# Patient Record
Sex: MALE | Race: WHITE | NOT HISPANIC OR LATINO | Employment: OTHER | URBAN - METROPOLITAN AREA
[De-identification: names, ages, dates, MRNs, and addresses within clinical notes are randomized per-mention and may not be internally consistent; named-entity substitution may affect disease eponyms.]

---

## 2017-01-17 ENCOUNTER — HOSPITAL ENCOUNTER (OUTPATIENT)
Facility: MEDICAL CENTER | Age: 72
End: 2017-01-17
Attending: PODIATRIST
Payer: MEDICARE

## 2017-01-17 LAB
GRAM STN SPEC: NORMAL
SIGNIFICANT IND 70042: NORMAL
SITE SITE: NORMAL
SOURCE SOURCE: NORMAL

## 2017-01-17 PROCEDURE — 87205 SMEAR GRAM STAIN: CPT

## 2017-01-17 PROCEDURE — 87186 SC STD MICRODIL/AGAR DIL: CPT

## 2017-01-17 PROCEDURE — 87070 CULTURE OTHR SPECIMN AEROBIC: CPT

## 2017-01-17 PROCEDURE — 87077 CULTURE AEROBIC IDENTIFY: CPT | Mod: 91

## 2017-01-19 LAB
BACTERIA WND AEROBE CULT: ABNORMAL
BACTERIA WND AEROBE CULT: ABNORMAL
GRAM STN SPEC: ABNORMAL
SIGNIFICANT IND 70042: ABNORMAL
SITE SITE: ABNORMAL
SOURCE SOURCE: ABNORMAL

## 2017-07-14 ENCOUNTER — OFFICE VISIT (OUTPATIENT)
Dept: MEDICAL GROUP | Facility: MEDICAL CENTER | Age: 72
End: 2017-07-14
Payer: MEDICARE

## 2017-07-14 VITALS
HEIGHT: 72 IN | DIASTOLIC BLOOD PRESSURE: 62 MMHG | SYSTOLIC BLOOD PRESSURE: 122 MMHG | BODY MASS INDEX: 24.11 KG/M2 | HEART RATE: 68 BPM | OXYGEN SATURATION: 98 % | WEIGHT: 178 LBS | RESPIRATION RATE: 16 BRPM | TEMPERATURE: 98.7 F

## 2017-07-14 DIAGNOSIS — K21.9 GASTROESOPHAGEAL REFLUX DISEASE WITHOUT ESOPHAGITIS: ICD-10-CM

## 2017-07-14 DIAGNOSIS — N13.9 OBSTRUCTIVE UROPATHY: ICD-10-CM

## 2017-07-14 DIAGNOSIS — I48.0 PAROXYSMAL ATRIAL FIBRILLATION (HCC): ICD-10-CM

## 2017-07-14 PROCEDURE — 99214 OFFICE O/P EST MOD 30 MIN: CPT | Performed by: INTERNAL MEDICINE

## 2017-07-14 NOTE — ASSESSMENT & PLAN NOTE
Gastroesophageal reflux is under good control primarily with diet. He is not significantly overweight.

## 2017-07-14 NOTE — ASSESSMENT & PLAN NOTE
He has obstructive uropathy. He tried taking Flomax but that caused myalgias. He is not taking any medications for this currently.

## 2017-07-14 NOTE — MR AVS SNAPSHOT
Chi Ashley Dr.   2017 2:20 PM   Office Visit   MRN: [id number]    Department:  [address] [place] [address]   Dept Phone:  [phone]    Description:  Male : 1945   Provider:  Servando Baker M.D.           Reason for Visit     Follow-Up Papers      Allergies as of 2017     No Known Allergies      Vital Signs     Blood Pressure Pulse Temperature Respirations Height Weight    122/62 mmHg 68 37.1 °C (98.7 °F) 16 1.829 m (6') 80.74 kg (178 lb)    Body Mass Index Oxygen Saturation Smoking Status             24.14 kg/m2 98% Never Smoker          Basic Information     Date Of Birth Sex Race Ethnicity Preferred Language    1945 Male White Non- English      Problem List              ICD-10-CM Priority Class Noted - Resolved    Splenic infarct D73.5   3/30/2011 - Present    Atrial fibrillation (CMS-HCC) I48.91   3/30/2011 - Present    Epigastric abdominal pain R10.13   3/30/2011 - Present    GERD (gastroesophageal reflux disease) K21.9   3/30/2011 - Present    Leukocytosis D72.829   3/30/2011 - Present    TIA (transient ischemic attack) G45.9   2013 - Present      Health Maintenance        Date Due Completion Dates    IMM DTaP/Tdap/Td Vaccine (1 - Tdap) 1965 ---    IMM ZOSTER VACCINE 2006 ---    IMM PNEUMOCOCCAL 65+ (ADULT) LOW/MEDIUM RISK SERIES (2 of 2 - PCV13) 3/29/2012 3/29/2011    IMM INFLUENZA (1) 2017 ---    COLONOSCOPY 2018            Current Immunizations     Pneumococcal polysaccharide vaccine (PPSV-23) 3/29/2011  6:38 PM      Below and/or attached are the medications your provider expects you to take. Review all of your home medications and newly ordered medications with your provider and/or pharmacist. Follow medication instructions as directed by your provider and/or pharmacist. Please keep your medication list with you and share with your provider. Update the information when medications are discontinued, doses are changed, or new  medications (including over-the-counter products) are added; and carry medication information at all times in the event of emergency situations     Allergies:  No Known Allergies          Medications  Valid as of: July 14, 2017 -  3:48 PM    Generic Name Brand Name Tablet Size Instructions for use    Flecainide Acetate (Tab) TAMBOCOR 150 MG Take 150 mg by mouth 2 times a day.        Metoprolol Succinate (TABLET SR 24 HR) TOPROL XL 25 MG Take 25 mg by mouth every day.        Rivaroxaban (Tab) XARELTO 20 MG Take 1 Tab by mouth every day.        .                 Medicines prescribed today were sent to:     [facility] - [place], [place] - [address]    [address] [place] [address]    Phone: [phone] Fax: [phone]    Open 24 Hours?: No      Medication refill instructions:       If your prescription bottle indicates you have medication refills left, it is not necessary to call your provider’s office. Please contact your pharmacy and they will refill your medication.    If your prescription bottle indicates you do not have any refills left, you may request refills at any time through one of the following ways: The online [employer] system (except Urgent Care), by calling your provider’s office, or by asking your pharmacy to contact your provider’s office with a refill request. Medication refills are processed only during regular business hours and may not be available until the next business day. Your provider may request additional information or to have a follow-up visit with you prior to refilling your medication.   *Please Note: Medication refills are assigned a new Rx number when refilled electronically. Your pharmacy may indicate that no refills were authorized even though a new prescription for the same medication is available at the pharmacy. Please request the medicine by name with the pharmacy before contacting your provider for a refill.           [employer] Access Code: Activation code not  generated  Current MyChart Status: Active

## 2017-07-14 NOTE — PROGRESS NOTES
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 male here today for follow-up of his various health problems and to have his [facility] form completed.    Atrial fibrillation  History of paroxysmal atrial fibrillation. He has undergone ablation therapy and is on flecainide without difficulty. He denies recent palpitations.    GERD (GASTROESOPHAGEAL REFLUX DISEASE)  Gastroesophageal reflux is under good control primarily with diet. He is not significantly overweight.       Diagnoses of Paroxysmal atrial fibrillation (CMS-HCC), Gastroesophageal reflux disease without esophagitis, and Obstructive uropathy were pertinent to this visit.    Allergies: Review of patient's allergies indicates no known allergies.  Current medicines (including changes today)  Current Outpatient Prescriptions   Medication Sig Dispense Refill   • flecainide (TAMBOCOR) 150 MG TABS Take 150 mg by mouth 2 times a day.     • rivaroxaban (XARELTO) 20 MG TABS tablet Take 1 Tab by mouth every day. 30 Tab 0   • metoprolol SR (TOPROL XL) 25 MG TB24 Take 25 mg by mouth every day.       No current facility-administered medications for this visit.       He  has a past medical history of A-fib (CMS-HCC) (04-20-11); Stroke (CMS-HCC) (8/2013); and Obstructive uropathy (7/14/2017).    ROS    Patient denies significant change in strength, weight or appetite.  No significant lightheadedness or headaches.  No change in vision, hearing, or swallowing.  No new dyspnea, coughing, chest pain, or palpitations.  No indigestion, abdominal pain, or change in bowel habits. Mild gastroesophageal reflux symptoms.  No change in urinating. Mild obstructive uropathy.  No new ankle swelling.       Objective:     PE:  /62 mmHg  Pulse 68  Temp(Src) 37.1 °C (98.7 °F)  Resp 16  Ht 1.829 m (6')  Wt 80.74 kg (178 lb)  BMI 24.14 kg/m2  SpO2 98%   Neck is supple without significant  lymphadenopathy or masses.  Lungs are clear with normal breath sounds without wheezes or rales .  Cardiovascular: peripheral circulation is satisfactory, heart sounds are unchanged and unremarkable.  Abdomen is soft, without masses or tenderness, with normal bowel sounds. Genitalia were normal male. Rectal exam was unremarkable with prostate about 3+ in size and a fairly normal contour and consistency.  Extremities are without significant edema, cyanosis or deformity. Neurologic function was normal with essentially normal sensation, strength, gait, and cerebellar functions. He could remember 3 objects and could draw a clock face.      Assessment and Plan:   The following treatment plan was discussed  1. Paroxysmal atrial fibrillation (CMS-[facility])      Follow-up periodically with Dr. Peña. He will report any significant palpitations or lightheadedness.   2. Gastroesophageal reflux disease without esophagitis      We briefly reviewed appropriate conservative therapy.   3. Obstructive uropathy      He will report any exacerbation of symptoms and we will consider further evaluation or treatment as indicated.       Followup: No Follow-up on file.

## 2017-07-14 NOTE — ASSESSMENT & PLAN NOTE
History of paroxysmal atrial fibrillation. He has undergone ablation therapy and is on flecainide without difficulty. He denies recent palpitations.

## 2017-08-08 ENCOUNTER — TELEPHONE (OUTPATIENT)
Dept: MEDICAL GROUP | Facility: MEDICAL CENTER | Age: 72
End: 2017-08-08

## 2017-08-08 NOTE — TELEPHONE ENCOUNTER
Phone Number Called: [phone]      Message: returned a call to patient regarding his concern about his diagnosis in his chart, spoke with Dr Baker and he advised he will discuss this with the patient at his next visit, left  letting patient know     Left Message for patient to call back: N\A

## 2017-08-10 ENCOUNTER — TELEPHONE (OUTPATIENT)
Dept: MEDICAL GROUP | Facility: MEDICAL CENTER | Age: 72
End: 2017-08-10

## 2017-08-18 ENCOUNTER — APPOINTMENT (OUTPATIENT)
Dept: MEDICAL GROUP | Facility: MEDICAL CENTER | Age: 72
End: 2017-08-18
Payer: MEDICARE

## 2017-09-28 ENCOUNTER — OFFICE VISIT (OUTPATIENT)
Dept: MEDICAL GROUP | Facility: MEDICAL CENTER | Age: 72
End: 2017-09-28
Payer: MEDICARE

## 2017-09-28 VITALS
OXYGEN SATURATION: 95 % | RESPIRATION RATE: 20 BRPM | HEART RATE: 64 BPM | SYSTOLIC BLOOD PRESSURE: 130 MMHG | TEMPERATURE: 98 F | WEIGHT: 178 LBS | BODY MASS INDEX: 24.11 KG/M2 | HEIGHT: 72 IN | DIASTOLIC BLOOD PRESSURE: 78 MMHG

## 2017-09-28 DIAGNOSIS — D73.5 SPLENIC INFARCT: ICD-10-CM

## 2017-09-28 DIAGNOSIS — M25.561 ACUTE PAIN OF RIGHT KNEE: ICD-10-CM

## 2017-09-28 DIAGNOSIS — R94.4 DECREASED GFR: ICD-10-CM

## 2017-09-28 DIAGNOSIS — R35.0 URINARY FREQUENCY: ICD-10-CM

## 2017-09-28 DIAGNOSIS — Z00.00 HEALTHCARE MAINTENANCE: ICD-10-CM

## 2017-09-28 DIAGNOSIS — Z86.73 HISTORY OF TIA (TRANSIENT ISCHEMIC ATTACK): ICD-10-CM

## 2017-09-28 DIAGNOSIS — H60.61 CHRONIC OTITIS EXTERNA OF RIGHT EAR, UNSPECIFIED TYPE: ICD-10-CM

## 2017-09-28 DIAGNOSIS — Z23 NEED FOR VACCINATION: ICD-10-CM

## 2017-09-28 DIAGNOSIS — Z86.79 HISTORY OF ATRIAL FIBRILLATION: ICD-10-CM

## 2017-09-28 PROBLEM — N13.9 OBSTRUCTIVE UROPATHY: Status: RESOLVED | Noted: 2017-07-14 | Resolved: 2017-09-28

## 2017-09-28 PROBLEM — M25.569 KNEE PAIN: Status: ACTIVE | Noted: 2017-09-28

## 2017-09-28 PROBLEM — H60.90 OTITIS EXTERNA: Status: ACTIVE | Noted: 2017-09-28

## 2017-09-28 PROCEDURE — G0008 ADMIN INFLUENZA VIRUS VAC: HCPCS | Performed by: FAMILY MEDICINE

## 2017-09-28 PROCEDURE — 90662 IIV NO PRSV INCREASED AG IM: CPT | Performed by: FAMILY MEDICINE

## 2017-09-28 PROCEDURE — G0009 ADMIN PNEUMOCOCCAL VACCINE: HCPCS | Performed by: FAMILY MEDICINE

## 2017-09-28 PROCEDURE — 99214 OFFICE O/P EST MOD 30 MIN: CPT | Mod: 25 | Performed by: FAMILY MEDICINE

## 2017-09-28 PROCEDURE — 90670 PCV13 VACCINE IM: CPT | Performed by: FAMILY MEDICINE

## 2017-09-28 ASSESSMENT — PATIENT HEALTH QUESTIONNAIRE - PHQ9: CLINICAL INTERPRETATION OF PHQ2 SCORE: 0

## 2017-09-28 NOTE — ASSESSMENT & PLAN NOTE
"The patient describes several years of urinary frequency without urgency or dysuria. The patient had a prostate exam performed on 7/14/2017 by Dr. Baker which, in his words, \"was unremarkable with prostate about 3+ in size and a fairly normal contour and consistency\". Patient states that he has had PSA testing in the past and this was normal. He is not currently interested in PSA testing. He states he used tamsulosin in the past for this issue and it caused him muscle cramps and so he is just treating it supportively.  "

## 2017-09-28 NOTE — ASSESSMENT & PLAN NOTE
The last kidney function I have was done on 3/24/2015. This showed a GFR of 54 and a creatinine of 1.31. The patient states that about 3 months ago he had labs done. He believes that these included a BMP and believes that the BMP was normal. He agrees to bring in this lab for me. Of note, I do see that the patient had a urinalysis in the past positive for protein.

## 2017-09-28 NOTE — ASSESSMENT & PLAN NOTE
In 2011 the patient underwent a radiofrequency ablation for atrial flutter. I see that he was discharged on flecainide and Predaxa. The patient then developed atrial fibrillation and in 2014 the patient underwent a radiofrequency ablation. I see that the patient was discharged on Eliquis and flecainide. Patient states that he was on anticoagulation and antiarrhythmic therapy for 4 months as a precaution. He states that Dr. Peña (cardiology) stopped these therapies. He states that he is not currently taking any medical therapy for this issue as he has not had any recurrent episodes of either atrial fibrillation or atrial flutter. The patient denies chest pain, shortness of breath and palpitations today.

## 2017-09-28 NOTE — ASSESSMENT & PLAN NOTE
Lipids: patient states these were done about 3 months ago. He agrees to bring in the labs.   Fasting Glucose: 3/24/15 was 100.   Hepatitis C Screen: patient states was tested in the past and was normal.   Colonoscopy: November 8, 2013 done, recommended 5 year follow-up.    Flu vaccine: given 09/28/17.   Pneumonia vaccine: pneumovax given 2011. Prevnar given 09/28/17.   Tdap: declines today.   Zoster vaccine: given 2016.

## 2017-09-28 NOTE — ASSESSMENT & PLAN NOTE
The patient states that he has chronic fungal otitis externa and follows with Dr. Burton who has him on a regimen of topical alcohol and vinegar. He states he is doing very well with this treatment and denies any ear pain.

## 2017-09-29 ENCOUNTER — TELEPHONE (OUTPATIENT)
Dept: MEDICAL GROUP | Facility: MEDICAL CENTER | Age: 72
End: 2017-09-29

## 2017-09-29 NOTE — TELEPHONE ENCOUNTER
I saw this patient in clinic yesterday. He was going to bring in recent labs so I can review them. Can someone call him and see if he can also track down his last PSA so I could look at it? Could we also get a records release from him when he comes in so we can request the last 3 notes from Dr. Peña (his cardiologist)? Thanks!

## 2017-09-29 NOTE — ASSESSMENT & PLAN NOTE
In 2013 the patient was admitted to the hospital for what he describes as a TIA. He was discharged on Xarelto, simvastatin, flecainide and metoprolol. On November 18, 2013 the patient followed up with Dr. Sears (neurology) who noted that the patient had an embolic event and recommended that he stay on Xarelto for at least a year. 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 11/15/2013 MRA shows a normal [place] and no significant neck artery stenosis. The patient denies residual effects.

## 2017-09-29 NOTE — PROGRESS NOTES
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     History of atrial fibrillation  In 2011 the patient underwent a radiofrequency ablation for atrial flutter. I see that he was discharged on flecainide and Predaxa. The patient then developed atrial fibrillation and in 2014 the patient underwent a radiofrequency ablation. I see that the patient was discharged on Eliquis and flecainide. Patient states that he was on anticoagulation and antiarrhythmic therapy for 4 months as a precaution. He states that Dr. Peña (cardiology) stopped these therapies. He states that he is not currently taking any medical therapy for this issue as he has not had any recurrent episodes of either atrial fibrillation or atrial flutter. The patient denies chest pain, shortness of breath and palpitations today.    Urinary frequency  The patient describes several years of urinary frequency without urgency or dysuria. The patient had a prostate exam performed on 7/14/2017 by Dr. Baker which, in his words, \"was unremarkable with prostate about 3+ in size and a fairly normal contour and consistency\". Patient states that he has had PSA testing in the past and this was normal. He is not currently interested in PSA testing. He states he used tamsulosin in the past for this issue and it caused him muscle cramps and so he is just treating it supportively.    Healthcare maintenance  Lipids: patient states these were done about 3 months ago. He agrees to bring in the labs.   Fasting Glucose: 3/24/15 was 100.   Hepatitis C Screen: patient states was tested in the past and was normal.   Colonoscopy: November 8, 2013 done, recommended 5 year follow-up.    Flu vaccine: given 09/28/17.   Pneumonia vaccine: pneumovax given 2011. Prevnar given 09/28/17.   Tdap: declines today.   Zoster vaccine: given " "2016.     Otitis externa  The patient states that he has chronic fungal otitis externa and follows with Dr. Burton who has him on a regimen of topical alcohol and vinegar. He states he is doing very well with this treatment and denies any ear pain.    Decreased GFR  The last kidney function I have was done on 3/24/2015. This showed a GFR of 54 and a creatinine of 1.31. The patient states that about 3 months ago he had labs done. He believes that these included a BMP and believes that the BMP was normal. He agrees to bring in this lab for me. Of note, I do see that the patient had a urinalysis in the past positive for protein.     Splenic infarct  In March 2011 the patient was admitted to the hospital for acute onset epigastric abdominal pain (since resolved). During this admission a CT scan of the abdomen was obtained which showed a \"sharply demarcated area of decreased attenuation anteriorly within the spleen, consistent with a recent splenic infarction\". Apparently, gastroenterology was consulted and an endoscopy showed gastroduodenitis    Knee pain  The patient states that about a month ago he knelt on the ground and caused some compression to his right lateral knee that has been superficially painful ever since. There are no range of motion difficulties and the patient is requesting Voltaren gel for this issue.    History of TIA (transient ischemic attack)  In 2013 the patient was admitted to the hospital for what he describes as a TIA. He was discharged on Xarelto, simvastatin, flecainide and metoprolol. On November 18, 2013 the patient followed up with Dr. Sears (neurology) who noted that the patient had an embolic event and recommended that he stay on Xarelto for at least a year. 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 11/15/2013 MRA shows a normal [place] and no significant neck artery stenosis. The patient denies residual effects.       No current " outpatient prescriptions on file prior to visit.     No current facility-administered medications on file prior to visit.        Past Medical History:   Diagnosis Date   • A-fib (CMS-HCC) 04-20-11    A Flutter   • GERD (gastroesophageal reflux disease)    • Obstructive uropathy    • TIA (transient ischemic attack)        Allergies: Review of patient's allergies indicates no known allergies.    Surgical History:  has a past surgical history that includes cardioversion; gastroscopy with biopsy (3/30/2011); appendectomy (1963); recovery (4/22/2011); and recovery (7/17/2014).    Family History: family history includes Cancer in his mother; Heart Disease in his father; Lung Disease in his father.    Social History:  reports that he has never smoked. He has never used smokeless tobacco. He reports that he drinks alcohol. He reports that he does not use drugs.    ROS: Denies fever, chest pain, shortness of breath, palpitations, nausea, vomiting, diarrhea, swelling..        Objective:     Vitals:    09/28/17 1445   BP: 130/78   Pulse: 64   Resp: 20   Temp: 36.7 °C (98 °F)   SpO2: 95%   Weight: 80.7 kg (178 lb)   Height: 1.829 m (6')       Physical Exam:  General: alert in no apparent distress.   Cardio: regular rate and rhythm, no murmurs, rubs or gallops.   Resp: CTAB no w/r/r.   GI: Soft, nontender, nondistended. No hepatosplenomegaly.  Neuro: Cranial nerves II through XII intact, Romberg normal, gait normal. Strength 5-5 in bilateral upper extremity.  Musculoskeletal: Bilateral knees without redness, swelling, warmth. No popliteal pulsations bilaterally. No joint line tenderness bilaterally. Anterior and posterior drawer normal bilaterally. No joint line tenderness bilaterally. Lachman negative bilaterally. There is no pain over the pes anserine bursa. Thessaly negative bilaterally.  ENMT: no pain on manipulation of the external ears. No acoustica canal erythema, swelling or discharge.    Assessment and Plan:     1. Need  for vaccination  - INFLUENZA VACCINE, HIGH DOSE (65+ ONLY)  - Pneumococcal Conjugate Vaccine 13-Valent <4yo IM    2. History of atrial fibrillation  Status post radiofrequency ablation and, per patient, 4 months of antiarrhythmic and anticoagulation therapy now doing well in RRR on no anticoagulation or antiarrhythmic therapy.   - Patient will bring in recent labs.  - records requested from Dr. Peña (cardiology) to ensure no further anticoagulation and antiarrhythmic therapy is needed.    3. Urinary frequency  Suspect BPH. Prostate exam from Dr. Baker described in HPI. Patient states last PSA normal. Not interested in repeat. Intolerant to medical therapy.  - Plan on getting urinalysis, will await patient to bring in labs to see if we also need to add on a urine microalbumin.  - will see if the patient can track down latest PSA so I can look at it.     4. History of TIA (transient ischemic attack)  Attributed to embolism from atrial arrhythmia, now in regular rate and rhythm. The patient is intolerant to statins. He is not on aspirin. Although this was noted to be an embolic event, nonetheless, I still think the patient may benefit from bASA. I will need to review recent labs and would like to review Dr. Peña's last note before starting this therapy. Patient intolerant to statins so I wonder if he'd be a good candidate for PCSK9 inhibitor. Again, I'd like to review Dr. Peña's last note and recent labs.   - obtain records from Dr. Peña and patient will bring in latest labs.     5. Healthcare maintenance  See history of present illness.    6. Chronic otitis externa of right ear, unspecified type  Established, stable.  -Continue to follow with ENT.    7. Decreased GFR  Patient agrees to bring in labs. If BMP is not included, will repeat.     8. Acute pain of right knee  Describes a superficial pain not reproducible today with normal exam. Because of the patient's age, I offered him a knee  x-ray. He declines.  - Diclofenac Sodium 1 % Gel; Apply 4 g to skin as directed 4 times a day as needed (knee pain).  Dispense: 6 Tube; Refill: 2. Will return if worsens or persists.     9. Possible splenic infarct  Incidental radiographic finding from many years ago which may represent a splenic infarct, of uncertain clinical significance. Exam normal today. Perhaps due to embolic event at that time? I offered the patient a gastroenterology consultation. He would like to speak about this with a radiologist and gastroenterologist that he knows instead of going through the referral process. Will also obtain records from Dr. Peña      Followup: Return in about 1 year (around 9/28/2018) for Wellness Visit, Long. After reviewing records from Dr. Peña and labs, sooner f/u may be in order.

## 2017-09-29 NOTE — ASSESSMENT & PLAN NOTE
The patient states that about a month ago he knelt on the ground and caused some compression to his right lateral knee that has been superficially painful ever since. There are no range of motion difficulties and the patient is requesting Voltaren gel for this issue.

## 2017-09-29 NOTE — LETTER
[facility]  Ryan Omalley M.D.  [address]  [facility] [address]  Fax: [phone]   Authorization for Release/Disclosure of   Protected Health Information   Name: PRESTON BRITO DR. : 1945 SSN: [id number]   Address: [address] [place] [id number] Phone:    [phone] (home) [phone] (work)   I authorize the entity listed below to release/disclose the PHI below to:   [facility]/Ryan Omalley M.D. and Ryan Omalley M.D.   Provider or Entity Name: Dr. Peña     Address [address]  [place]  [address]   Phone:  [phone]    Fax:  [phone]   Reason for request: continuity of care   Information to be released:    [  ] LAST COLONOSCOPY,  including any PATH REPORT and follow-up  [  ] LAST FIT/COLOGUARD RESULT [  ] LAST DEXA  [  ] LAST MAMMOGRAM  [  ] LAST PAP  [  ] LAST LABS [  ] RETINA EXAM REPORT  [  ] IMMUNIZATION RECORDS  [  x] Release all info      [  ] Check here and initial the line next to each item to release ALL health information INCLUDING  _____ Care and treatment for drug and / or alcohol abuse  _____ HIV testing, infection status, or AIDS  _____ Genetic Testing    DATES OF SERVICE OR TIME PERIOD TO BE DISCLOSED: _____________  I understand and acknowledge that:  * This Authorization may be revoked at any time by you in writing, except if your health information has already been used or disclosed.  * Your health information that will be used or disclosed as a result of you signing this authorization could be re-disclosed by the recipient. If this occurs, your re-disclosed health information may no longer be protected by State or Federal laws.  * You may refuse to sign this Authorization. Your refusal will not affect your ability to obtain treatment.  * This Authorization becomes effective upon signing and will  on (date) __________.      If no date is indicated, this Authorization will  one (1)  year from the signature date.    Name: Chi Ashley Dr.    Signature:   Date:     9/29/2017       PLEASE FAX REQUESTED RECORDS BACK TO: [phone]

## 2017-09-29 NOTE — ASSESSMENT & PLAN NOTE
"In March 2011 the patient was admitted to the hospital for acute onset epigastric abdominal pain (since resolved). During this admission a CT scan of the abdomen was obtained which showed a \"sharply demarcated area of decreased attenuation anteriorly within the spleen, consistent with a recent splenic infarction\". Apparently, gastroenterology was consulted and an endoscopy showed gastroduodenitis  "

## 2018-05-25 ENCOUNTER — OFFICE VISIT (OUTPATIENT)
Dept: MEDICAL GROUP | Facility: MEDICAL CENTER | Age: 73
End: 2018-05-25
Payer: MEDICARE

## 2018-05-25 ENCOUNTER — TELEPHONE (OUTPATIENT)
Dept: MEDICAL GROUP | Facility: MEDICAL CENTER | Age: 73
End: 2018-05-25

## 2018-05-25 VITALS
HEART RATE: 58 BPM | SYSTOLIC BLOOD PRESSURE: 118 MMHG | OXYGEN SATURATION: 97 % | RESPIRATION RATE: 20 BRPM | WEIGHT: 176.37 LBS | HEIGHT: 72 IN | TEMPERATURE: 97.6 F | DIASTOLIC BLOOD PRESSURE: 80 MMHG | BODY MASS INDEX: 23.89 KG/M2

## 2018-05-25 DIAGNOSIS — M25.561 ACUTE PAIN OF RIGHT KNEE: ICD-10-CM

## 2018-05-25 DIAGNOSIS — R35.0 URINARY FREQUENCY: ICD-10-CM

## 2018-05-25 DIAGNOSIS — Z86.79 HISTORY OF ATRIAL FIBRILLATION: ICD-10-CM

## 2018-05-25 DIAGNOSIS — Z86.73 HISTORY OF TIA (TRANSIENT ISCHEMIC ATTACK): ICD-10-CM

## 2018-05-25 DIAGNOSIS — R94.4 DECREASED GFR: ICD-10-CM

## 2018-05-25 DIAGNOSIS — Z00.00 HEALTHCARE MAINTENANCE: ICD-10-CM

## 2018-05-25 DIAGNOSIS — R97.20 ELEVATED PSA: ICD-10-CM

## 2018-05-25 PROCEDURE — 99214 OFFICE O/P EST MOD 30 MIN: CPT | Performed by: FAMILY MEDICINE

## 2018-05-25 NOTE — ASSESSMENT & PLAN NOTE
Lipids: see media.   Fasting Glucose: see media.  Hepatitis C Screen: patient states was tested in the past and was normal.   Colonoscopy: November 8, 2013 done, recommended 5 year follow-up.    Pneumonia vaccine: pneumovax given 2011. Prevnar given 09/28/17.   Tdap: declines today.   Zoster vaccine: given 2016.   Shingrix: patient states he received.

## 2018-05-25 NOTE — ASSESSMENT & PLAN NOTE
The patient comes in with a 4 month history of right knee pain. He states that he was squatting and developed right medial knee pain with a slight effusion. The effusion improved, however, the medial knee pain persists. NSAIDs do provide some relief. He says that sometimes he notices catching but no locking.

## 2018-05-25 NOTE — ASSESSMENT & PLAN NOTE
7/1/17 PSA in labs provided by patient was 5.56. 11/1/17 PSA was 5.38. I did recommend a recheck. Patient understands the risk prostate cancer but declines this rechecked.

## 2018-05-25 NOTE — TELEPHONE ENCOUNTER
We got records from Dr. Peña (cardiology with [facility]) but they weren't complete. Can we re-request his complete record?

## 2018-05-25 NOTE — PROGRESS NOTES
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 The patient is alone.     Knee pain  The patient comes in with a 4 month history of right knee pain. He states that he was squatting and developed right medial knee pain with a slight effusion. The effusion improved, however, the medial knee pain persists. NSAIDs do provide some relief. He says that sometimes he notices catching but no locking.    Decreased GFR  11/17 labs provided by the patient showed GFR of 53.    Elevated PSA  7/1/17 PSA in labs provided by patient was 5.56. 11/1/17 PSA was 5.38. I did recommend a recheck. Patient understands the risk prostate cancer but declines this rechecked.    History of atrial fibrillation  In 2011 the patient underwent a radiofrequency ablation for atrial flutter. I see that he was discharged on flecainide and Predaxa. The patient then developed atrial fibrillation and in 2014 the patient underwent a radiofrequency ablation. I see that the patient was discharged on Eliquis and flecainide. Patient stated that he was on anticoagulation and antiarrhythmic therapy for 4 months as a precaution. He stated at the last visit that Dr. Peña (cardiology) stopped these therapies he is not currently taking any medical therapy for this issue as he has not had any recurrent episodes of either atrial fibrillation or atrial flutter. We were able to obtain records from Dr. Peña these records appear to be incomplete. The patient continues to assure me that he is not supposed to be on antiarrhythmic therapy or anticoagulants.    History of TIA (transient ischemic attack)  In 2013 the patient was admitted to the hospital for what he describes as a TIA. He was discharged on Xarelto, simvastatin, flecainide and metoprolol. On November 18, 2013 the patient followed up with Dr. Sears (neurology) who noted that the  patient had an embolic event and recommended that he stay on Xarelto for at least a year. 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 11/15/2013 MRA shows a normal [place] and no significant neck artery stenosis. The patient does not appear to have any residual effects. He is not currently maintained on antiarrhythmic or anticoagulant therapy, as discussed elsewhere.    Urinary frequency  The patient continues to describe urinary frequency issues. He states he would like to follow up with his urologist regarding this.    Healthcare maintenance  Lipids: see media.   Fasting Glucose: see media.  Hepatitis C Screen: patient states was tested in the past and was normal.   Colonoscopy: November 8, 2013 done, recommended 5 year follow-up.    Pneumonia vaccine: pneumovax given 2011. Prevnar given 09/28/17.   Tdap: declines today.   Zoster vaccine: given 2016.   Shingrix: patient states he received.       Current Outpatient Prescriptions on File Prior to Visit   Medication Sig Dispense Refill   • Diclofenac Sodium 1 % Gel Apply 4 g to skin as directed 4 times a day as needed (knee pain). 6 Tube 2     No current facility-administered medications on file prior to visit.        Past Medical History:   Diagnosis Date   • A-fib (HCC) 04-20-11    A Flutter   • GERD (gastroesophageal reflux disease)    • Obstructive uropathy    • TIA (transient ischemic attack)        Allergies: Patient has no known allergies.    Surgical History:  has a past surgical history that includes cardioversion; gastroscopy with biopsy (3/30/2011); appendectomy (1963); recovery (4/22/2011); and recovery (7/17/2014).    Family History: family history includes Cancer in his mother; Heart Disease in his father; Lung Disease in his father.    Social History:  reports that he has never smoked. He has never used smokeless tobacco. He reports that he drinks alcohol. He reports that he does not use  drugs.    ROS: no CP or SOB.        Objective:     Vitals:    05/25/18 1150   BP: 118/80   Pulse: (!) 58   Resp: 20   Temp: 36.4 °C (97.6 °F)   SpO2: 97%   Weight: 80 kg (176 lb 5.9 oz)   Height: 1.829 m (6')       Physical Exam:  General: alert in no apparent distress.   Cardio: regular rate and rhythm, no murmurs, rubs or gallops.   Resp: CTAB no w/r/r.   Right knee: Some medial joint line tenderness, no lateral joint line tenderness. No tenderness to palpation over the quadriceps or patellar tendon. Negative anterior drawer, negative posterior drawer. Negative Kane. Positive Thessaly on the medial side. Negative varus and valgus stress.        Assessment and Plan:     1. Acute pain of right knee  With some medial joint line tenderness and a positive Thessaly test.   - MR-KNEE-W/O RIGHT; Future    2. Decreased GFR  - MICROALBUMIN CREAT RATIO URINE; Future  - COMP METABOLIC PANEL; Future    3. Elevated PSA  - patient declines recheck.     4. History of atrial fibrillation  - re-request full record from patient's cardiologist. Patient states he is not supposed to be on anticoagulants or antiarrhythmics/AV richard blockers.  - start ASA 81 mg daily.     5. History of TIA (transient ischemic attack)  - start ASA 81 mg daily, patient states he is not supposed to be on any anticoagulants.  - Advised the patient speak with his cardiologist about a PCSK9 inhibitor.     6. Urinary frequency  - f/u urology.     7. Healthcare maintenance  - see HPI.         Followup: Return in about 1 year (around 5/25/2019), or if symptoms worsen or fail to improve, for Wellness Visit, Long.

## 2018-05-25 NOTE — ASSESSMENT & PLAN NOTE
In 2013 the patient was admitted to the hospital for what he describes as a TIA. He was discharged on Xarelto, simvastatin, flecainide and metoprolol. On November 18, 2013 the patient followed up with Dr. Sears (neurology) who noted that the patient had an embolic event and recommended that he stay on Xarelto for at least a year. 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 11/15/2013 MRA shows a normal [place] and no significant neck artery stenosis. The patient does not appear to have any residual effects. He is not currently maintained on antiarrhythmic or anticoagulant therapy, as discussed elsewhere.

## 2018-05-25 NOTE — ASSESSMENT & PLAN NOTE
The patient continues to describe urinary frequency issues. He states he would like to follow up with his urologist regarding this.

## 2018-05-25 NOTE — ASSESSMENT & PLAN NOTE
In 2011 the patient underwent a radiofrequency ablation for atrial flutter. I see that he was discharged on flecainide and Predaxa. The patient then developed atrial fibrillation and in 2014 the patient underwent a radiofrequency ablation. I see that the patient was discharged on Eliquis and flecainide. Patient stated that he was on anticoagulation and antiarrhythmic therapy for 4 months as a precaution. He stated at the last visit that Dr. Peña (cardiology) stopped these therapies he is not currently taking any medical therapy for this issue as he has not had any recurrent episodes of either atrial fibrillation or atrial flutter. We were able to obtain records from Dr. Peña these records appear to be incomplete. The patient continues to assure me that he is not supposed to be on antiarrhythmic therapy or anticoagulants.

## 2018-05-26 ENCOUNTER — HOSPITAL ENCOUNTER (OUTPATIENT)
Dept: RADIOLOGY | Facility: MEDICAL CENTER | Age: 73
End: 2018-05-26
Attending: FAMILY MEDICINE
Payer: MEDICARE

## 2018-05-26 ENCOUNTER — HOSPITAL ENCOUNTER (OUTPATIENT)
Dept: LAB | Facility: MEDICAL CENTER | Age: 73
End: 2018-05-26
Attending: FAMILY MEDICINE
Payer: MEDICARE

## 2018-05-26 DIAGNOSIS — M25.561 ACUTE PAIN OF RIGHT KNEE: ICD-10-CM

## 2018-05-26 DIAGNOSIS — R94.4 DECREASED GFR: ICD-10-CM

## 2018-05-26 LAB
ALBUMIN SERPL BCP-MCNC: 3.8 G/DL (ref 3.2–4.9)
ALBUMIN/GLOB SERPL: 1.4 G/DL
ALP SERPL-CCNC: 73 U/L (ref 30–99)
ALT SERPL-CCNC: 17 U/L (ref 2–50)
ANION GAP SERPL CALC-SCNC: 5 MMOL/L (ref 0–11.9)
AST SERPL-CCNC: 17 U/L (ref 12–45)
BILIRUB SERPL-MCNC: 0.5 MG/DL (ref 0.1–1.5)
BUN SERPL-MCNC: 25 MG/DL (ref 8–22)
CALCIUM SERPL-MCNC: 9.1 MG/DL (ref 8.5–10.5)
CHLORIDE SERPL-SCNC: 105 MMOL/L (ref 96–112)
CO2 SERPL-SCNC: 27 MMOL/L (ref 20–33)
CREAT SERPL-MCNC: 1.44 MG/DL (ref 0.5–1.4)
CREAT UR-MCNC: 89.5 MG/DL
GLOBULIN SER CALC-MCNC: 2.8 G/DL (ref 1.9–3.5)
GLUCOSE SERPL-MCNC: 101 MG/DL (ref 65–99)
MICROALBUMIN UR-MCNC: <0.7 MG/DL
MICROALBUMIN/CREAT UR: NORMAL MG/G (ref 0–30)
POTASSIUM SERPL-SCNC: 4.5 MMOL/L (ref 3.6–5.5)
PROT SERPL-MCNC: 6.6 G/DL (ref 6–8.2)
SODIUM SERPL-SCNC: 137 MMOL/L (ref 135–145)

## 2018-05-26 PROCEDURE — 73721 MRI JNT OF LWR EXTRE W/O DYE: CPT | Mod: RT

## 2018-05-26 PROCEDURE — 36415 COLL VENOUS BLD VENIPUNCTURE: CPT

## 2018-05-26 PROCEDURE — 80053 COMPREHEN METABOLIC PANEL: CPT

## 2018-05-26 PROCEDURE — 82043 UR ALBUMIN QUANTITATIVE: CPT

## 2018-05-26 PROCEDURE — 82570 ASSAY OF URINE CREATININE: CPT

## 2018-05-29 DIAGNOSIS — N18.30 STAGE 3 CHRONIC KIDNEY DISEASE (HCC): ICD-10-CM

## 2018-06-07 ENCOUNTER — APPOINTMENT (OUTPATIENT)
Dept: ADMISSIONS | Facility: MEDICAL CENTER | Age: 73
End: 2018-06-07
Attending: ORTHOPAEDIC SURGERY
Payer: MEDICARE

## 2018-06-07 DIAGNOSIS — Z01.810 PRE-OPERATIVE CARDIOVASCULAR EXAMINATION: ICD-10-CM

## 2018-06-07 LAB — EKG IMPRESSION: NORMAL

## 2018-06-15 ENCOUNTER — HOSPITAL ENCOUNTER (OUTPATIENT)
Facility: MEDICAL CENTER | Age: 73
End: 2018-06-15
Attending: ORTHOPAEDIC SURGERY | Admitting: ORTHOPAEDIC SURGERY
Payer: MEDICARE

## 2018-06-15 ENCOUNTER — APPOINTMENT (OUTPATIENT)
Dept: RADIOLOGY | Facility: MEDICAL CENTER | Age: 73
End: 2018-06-15
Attending: ORTHOPAEDIC SURGERY
Payer: MEDICARE

## 2018-06-15 VITALS
TEMPERATURE: 97.5 F | BODY MASS INDEX: 23.83 KG/M2 | WEIGHT: 175.93 LBS | RESPIRATION RATE: 16 BRPM | SYSTOLIC BLOOD PRESSURE: 145 MMHG | HEIGHT: 72 IN | DIASTOLIC BLOOD PRESSURE: 74 MMHG | OXYGEN SATURATION: 97 %

## 2018-06-15 PROCEDURE — 700105 HCHG RX REV CODE 258: Performed by: ORTHOPAEDIC SURGERY

## 2018-06-15 PROCEDURE — A6222 GAUZE <=16 IN NO W/SAL W/O B: HCPCS | Performed by: ORTHOPAEDIC SURGERY

## 2018-06-15 PROCEDURE — 160025 RECOVERY II MINUTES (STATS): Performed by: ORTHOPAEDIC SURGERY

## 2018-06-15 PROCEDURE — 700101 HCHG RX REV CODE 250

## 2018-06-15 PROCEDURE — 160009 HCHG ANES TIME/MIN: Performed by: ORTHOPAEDIC SURGERY

## 2018-06-15 PROCEDURE — 160035 HCHG PACU - 1ST 60 MINS PHASE I: Performed by: ORTHOPAEDIC SURGERY

## 2018-06-15 PROCEDURE — 160041 HCHG SURGERY MINUTES - EA ADDL 1 MIN LEVEL 4: Performed by: ORTHOPAEDIC SURGERY

## 2018-06-15 PROCEDURE — 500423 HCHG DRESSING, ABD COMBINE: Performed by: ORTHOPAEDIC SURGERY

## 2018-06-15 PROCEDURE — 160048 HCHG OR STATISTICAL LEVEL 1-5: Performed by: ORTHOPAEDIC SURGERY

## 2018-06-15 PROCEDURE — 160029 HCHG SURGERY MINUTES - 1ST 30 MINS LEVEL 4: Performed by: ORTHOPAEDIC SURGERY

## 2018-06-15 PROCEDURE — 160046 HCHG PACU - 1ST 60 MINS PHASE II: Performed by: ORTHOPAEDIC SURGERY

## 2018-06-15 PROCEDURE — 160002 HCHG RECOVERY MINUTES (STAT): Performed by: ORTHOPAEDIC SURGERY

## 2018-06-15 PROCEDURE — 502580 HCHG PACK, KNEE ARTHROSCOPY: Performed by: ORTHOPAEDIC SURGERY

## 2018-06-15 PROCEDURE — 500028 HCHG ARTHROWAND TURBOVAC 3.5/90 SUCT.: Performed by: ORTHOPAEDIC SURGERY

## 2018-06-15 PROCEDURE — 700111 HCHG RX REV CODE 636 W/ 250 OVERRIDE (IP)

## 2018-06-15 RX ORDER — MORPHINE SULFATE 0.5 MG/ML
INJECTION, SOLUTION EPIDURAL; INTRATHECAL; INTRAVENOUS
Status: DISCONTINUED | OUTPATIENT
Start: 2018-06-15 | End: 2018-06-15 | Stop reason: HOSPADM

## 2018-06-15 RX ORDER — BUPIVACAINE HYDROCHLORIDE AND EPINEPHRINE 5; 5 MG/ML; UG/ML
INJECTION, SOLUTION EPIDURAL; INTRACAUDAL; PERINEURAL
Status: DISCONTINUED | OUTPATIENT
Start: 2018-06-15 | End: 2018-06-15 | Stop reason: HOSPADM

## 2018-06-15 RX ORDER — SODIUM CHLORIDE, SODIUM LACTATE, POTASSIUM CHLORIDE, CALCIUM CHLORIDE 600; 310; 30; 20 MG/100ML; MG/100ML; MG/100ML; MG/100ML
INJECTION, SOLUTION INTRAVENOUS
Status: DISCONTINUED | OUTPATIENT
Start: 2018-06-15 | End: 2018-06-15 | Stop reason: HOSPADM

## 2018-06-15 RX ADMIN — SODIUM CHLORIDE, POTASSIUM CHLORIDE, SODIUM LACTATE AND CALCIUM CHLORIDE: 600; 310; 30; 20 INJECTION, SOLUTION INTRAVENOUS at 07:01

## 2018-06-15 ASSESSMENT — PAIN SCALES - GENERAL
PAINLEVEL_OUTOF10: 0
PAINLEVEL_OUTOF10: 2

## 2018-06-15 NOTE — OP REPORT
DATE OF SERVICE:  06/15/2018    PREOPERATIVE DIAGNOSES:  Right knee medial meniscus tear, medial arthritis,   anterior central loose body with spurring over the tibial insertion of the   ACL.  Patellofemoral arthritis.    POSTOPERATIVE DIAGNOSES:  Right knee medial meniscus tear, medial arthritis,   anterior central loose body with spurring over the tibial insertion of the   ACL.  Patellofemoral arthritis with some slight fraying of the lateral   meniscus.    PROCEDURES PERFORMED:  1.  Exam under anesthesia.  2.  Medial meniscectomy.  3.  Medial femoral chondroplasty.  4.  Synovectomy.  5.  Excision of loose body  6.  Debridement of tibial spine spur.  7.  Partial anterior debridement of the lateral meniscus.  8.  Fluoroscopy.  9.  Local injection of Marcaine with epinephrine, and morphine.    SURGEON:  Miguel Chase MD    FIRST ASSISTANT:  KARRI Padilla    ANESTHESIOLOGIST:  Holger Rodriguez MD.    ANESTHESIA:  General.    COMPLICATIONS:  None.    DRAINS:  None.    SPECIMENS:  None.    TOURNIQUET TIME:  13 minutes at 250 mmHg.    ESTIMATED BLOOD LOSS:  20 mL    FLUIDS:  Crystalloid and Kefzol.    INDICATIONS:  The patient has had ongoing problems with his knee.  He is very   active.  He remembers doing a squat having pain in his knee.  Difficulty with   the use.  MRI confirmed a complex tearing of the posterior horn of medial   meniscus.  Plain x-rays demonstrated an anterior spur with superior loose   body.  The risks, benefits and options were discussed with him and he wished   to undergo the above procedure.    FINDINGS:  There was no evidence of any instability.  Full range of motion.    There was noted to be grade II to III changes of the patella and trochlea.  No   significant flaps.  Grade II to III changes of the mediofemoral condyle and   posterior aspect of the medial tibia.  The posterior horn of the medial tibia   had a large parrot beak extending from the medial aspect extending to the  root   region.  The root was noted to be intact.  This was all debrided down to   stable meniscus.    Anteriorly, there was noted to be significant synovitis around the loose body   which was identified and removed with resector and pituitary rongeurs.  There   was noted to be some slight fraying of the central edge of the lateral   meniscus which was easily debrided.  No significant degenerative changes of   the lateral compartment.    PROCEDURE IN DETAIL:  The patient was taken to the operating room, general   anesthesia was induced, all pressure points well padded.  Right lower   extremity was prepped and draped in usual fashion.  Before prepping and   draping, a timeout was called and the anterolateral aspect was injected with   Marcaine with epinephrine.    The standard lateral portal was made.  Once the equipment was working, the   knee was systemically examined.  There was noted to be a large medial plica,   significant anterior synovitis.    Using the resector and upbiter, a posterior horn medial meniscectomy was   performed down to stable meniscus and medial and femoral chondroplasty was   performed.    Working to the central portion of the knee using the ablator and resector, a   loose body was identified with significant synovitis.  I subsequently had this   resected down to half its size and removed with pituitary rongeur, completely   removing this.    There was noted to be significant spur over the anterior insertional site of   the ACL, which was further debrided.    I placed the knee in a figure-of-four, the shaver was used in order shave out   the most central fraying of the lateral meniscus.  There was no significant   lateral meniscus tear.    The resector was then used to perform a medial plicectomy as well as with the   ablator.  At this point, the tourniquet was elevated as there was bleeding   from the bone spur.  The most medial edge of the mediofemoral condyle also had   a chondroplasty  performed where the plica was rubbing against this region.    The knee was again systemically examined.  Adequate hemostasis was obtained   with electrocautery.  Marcaine with epinephrine and morphine was injected   locally into the capsule.    Further copious irrigation was performed.  The wounds were suctioned dried.    Xeroform was then placed over the portals.  A bulky dressing was applied.    Tourniquet was released.  The patient brought to recovery in stable condition,   no complications.  It is significant that after removing the loose body   debriding the spur fluoroscopy was brought in, confirming no further loose   body and excellent decompression of the anvil osteophyte of the tibial spine.    PLAN:  The plan is for him to start ankle pumps for DVT prophylaxis.  Remove   his dressing in 3 days.  Place a Band-Aid over the wounds and progressive   range of motion.       ____________________________________     MD KRYSTAL GIBSON / XAVI    DD:  06/15/2018 09:05:29  DT:  06/15/2018 10:29:36    D#:  [id number]  Job#:  [id number]

## 2018-06-15 NOTE — OR NURSING
0950 in stage 2 cap refill q 3 sec return in rt foot , warm pink, pt denies any notable pain , spouse present   1005 dc instructions given to spouse and pt, both v/u understanding, pt meets criteria for dc stage 2.

## 2018-06-15 NOTE — OR NURSING
0858 received from or  resp spont r knee dressing c/d/I  dp2+  Foot warm  Good movement  Elevated  Ice pack applied  No c/o pain

## 2018-06-22 ENCOUNTER — HOSPITAL ENCOUNTER (OUTPATIENT)
Dept: RADIOLOGY | Facility: MEDICAL CENTER | Age: 73
End: 2018-06-22
Attending: ORTHOPAEDIC SURGERY
Payer: MEDICARE

## 2018-06-22 DIAGNOSIS — S83.241A ACUTE MEDIAL MENISCUS TEAR OF RIGHT KNEE, INITIAL ENCOUNTER: ICD-10-CM

## 2018-06-22 PROCEDURE — 93971 EXTREMITY STUDY: CPT | Mod: RT

## 2018-08-17 ENCOUNTER — TELEPHONE (OUTPATIENT)
Dept: MEDICAL GROUP | Facility: MEDICAL CENTER | Age: 73
End: 2018-08-17

## 2018-08-17 NOTE — TELEPHONE ENCOUNTER
"Pt came in today 8/17/18 to schedule an annual appt with PCP Dr. Omalley, but unfortunately what Dr. Omalley had available was not conducive with pt's personal schedule. I was able to get pt scheduled with a different provider in our office and he was ok with that. Pt then asked for his \"last visit paperwork\" I then printed his after visit summary and he clarified he wanted entire record since the time he's been a patient here. I explained to pt that medical records is the entity he'd have to go through since they format and print them a certain way, and that we're unable to do this. 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 He wasn't sure why we couldn't disclose his records since it is his information. Before pt left he then asked for direct line to our office and told me it had been given to him last time he was here. I explained we didn't have a direct line and that the main line [phone] filters these calls based on need, and that we would then receive those calls. I was told absolutely not to give our direct line to pt's by manager. Pt thanked me and said coming in to our office was easier, I told him he was always more than welcome to come in if that was easier for him in the future and to have a great day.   "

## 2018-08-30 ENCOUNTER — OFFICE VISIT (OUTPATIENT)
Dept: MEDICAL GROUP | Facility: MEDICAL CENTER | Age: 73
End: 2018-08-30
Payer: MEDICARE

## 2018-08-30 VITALS
SYSTOLIC BLOOD PRESSURE: 124 MMHG | HEIGHT: 72 IN | DIASTOLIC BLOOD PRESSURE: 72 MMHG | WEIGHT: 182.1 LBS | HEART RATE: 62 BPM | OXYGEN SATURATION: 99 % | TEMPERATURE: 98.6 F | BODY MASS INDEX: 24.66 KG/M2

## 2018-08-30 DIAGNOSIS — Z98.890 S/P ARTHROSCOPIC SURGERY OF RIGHT KNEE: ICD-10-CM

## 2018-08-30 DIAGNOSIS — Z02.89 ENCOUNTER FOR PHYSICAL EXAMINATION RELATED TO EMPLOYMENT: ICD-10-CM

## 2018-08-30 DIAGNOSIS — R94.4 DECREASED GFR: ICD-10-CM

## 2018-08-30 DIAGNOSIS — R97.20 ELEVATED PSA: ICD-10-CM

## 2018-08-30 DIAGNOSIS — Z86.79 HISTORY OF ATRIAL FIBRILLATION: ICD-10-CM

## 2018-08-30 DIAGNOSIS — Z23 NEED FOR VACCINATION: ICD-10-CM

## 2018-08-30 PROBLEM — M25.569 KNEE PAIN: Status: RESOLVED | Noted: 2017-09-28 | Resolved: 2018-08-30

## 2018-08-30 PROCEDURE — 90715 TDAP VACCINE 7 YRS/> IM: CPT | Performed by: PHYSICIAN ASSISTANT

## 2018-08-30 PROCEDURE — 99213 OFFICE O/P EST LOW 20 MIN: CPT | Mod: 25 | Performed by: PHYSICIAN ASSISTANT

## 2018-08-30 PROCEDURE — 90471 IMMUNIZATION ADMIN: CPT | Performed by: PHYSICIAN ASSISTANT

## 2018-08-30 NOTE — ASSESSMENT & PLAN NOTE
Per patient he has discussed with urology and is going to have a uroscopy and renal ultrasound just to check things out but he thinks the decrease GFR is related to his splenic infarct history.

## 2018-08-30 NOTE — PROGRESS NOTES
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 Form completed and scanned. 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 Had regular follow up visits with urology and dermatology today.    Decreased GFR  Per patient he has discussed with urology and is going to have a uroscopy and renal ultrasound just to check things out but he thinks the decrease GFR is related to his splenic infarct history.    Elevated PSA  Just had urology visit today. Sees Dr. Dupree.     History of atrial fibrillation  No recurrence of symptoms. Not on medication to control rate    S/P arthroscopic surgery of right knee  Dr. Chase, medial and lateral meniscus and removal of foreign body, doing very well.       Current medicines (including changes today)  Current Outpatient Prescriptions   Medication Sig Dispense Refill   • aspirin EC (ECOTRIN) 81 MG Tablet Delayed Response Take 1 Tab by mouth every day. 30 Tab 0     No current facility-administered medications for this visit.      He  has a past medical history of A-fib (HCC) (04-20-11); GERD (gastroesophageal reflux disease); Obstructive uropathy; and TIA (transient ischemic attack).    ROS   No fever/chills. No weight change. No headache/dizziness. No focal weakness. No sore throat, nasal congestion, ear pain. No chest pain, no shortness of breath, difficulty breathing. No n/v/d/c or abdominal pain. No joint pain or swelling.       Objective:     Blood pressure 124/72, pulse 62, temperature 37 °C (98.6 °F), height 1.829 m (6' 0.01\"), weight 82.6 kg (182 lb 1.6 oz), SpO2 99 %. Body mass index is 24.69 kg/m².   Physical Exam:  Constitutional: WDWN, NAD  Skin: Warm, dry, good turgor  Eye: Equal, round and reactive, conjunctiva clear, lids normal.  Neck: Trachea midline, no masses, no thyromegaly. No cervical or " supraclavicular lymphadenopathy  Respiratory: Unlabored respiratory effort, lungs clear to auscultation, no wheezes, no ronchi.  Cardiovascular: Normal S1, S2, no murmur, no leg edema.  Neuro: normal gait, 5/5 strength upper and lower extremities, cranial nerves intact, 3 word recall after 5 minutes intact,   Psych: Alert and oriented x3, normal affect and mood.    Assessment and Plan:   The following treatment plan was discussed    1. Encounter for physical examination related to employment  Form completed and faxed    2. Need for vaccination    - TDAP VACCINE =>8YO IM    3. Decreased GFR      4. Elevated PSA      5. History of atrial fibrillation      6. S/P arthroscopic surgery of right knee        Followup: Return if symptoms worsen or fail to improve.

## 2018-09-11 ENCOUNTER — HOSPITAL ENCOUNTER (OUTPATIENT)
Dept: RADIOLOGY | Facility: MEDICAL CENTER | Age: 73
End: 2018-09-11
Attending: UROLOGY
Payer: MEDICARE

## 2018-09-11 DIAGNOSIS — R39.12 POOR URINARY STREAM: ICD-10-CM

## 2018-09-11 PROCEDURE — 76775 US EXAM ABDO BACK WALL LIM: CPT

## 2018-10-24 ENCOUNTER — PATIENT OUTREACH (OUTPATIENT)
Dept: HEALTH INFORMATION MANAGEMENT | Facility: OTHER | Age: 73
End: 2018-10-24

## 2018-10-24 NOTE — PROGRESS NOTES
Outcome: Left Message    Please transfer to Patient Outreach Team at [phone] when patient returns call.    WebIZ Checked & Epic Updated:  yes    [facility] Verified: yes    Attempt # 1

## 2018-11-01 NOTE — PROGRESS NOTES
Outcome: Left Message    Please transfer to Patient Outreach Team at [phone] when patient returns call.      Attempt # 2

## 2018-11-08 NOTE — PROGRESS NOTES
Outcome: Left Message    Please transfer to Patient Outreach Team at [phone] when patient returns call.    Attempt # 3

## 2019-07-18 ENCOUNTER — APPOINTMENT (OUTPATIENT)
Dept: ADMISSIONS | Facility: MEDICAL CENTER | Age: 74
End: 2019-07-18
Attending: SURGERY
Payer: MEDICARE

## 2019-07-24 ENCOUNTER — ANESTHESIA (OUTPATIENT)
Dept: SURGERY | Facility: MEDICAL CENTER | Age: 74
End: 2019-07-24
Payer: MEDICARE

## 2019-07-24 ENCOUNTER — ANESTHESIA EVENT (OUTPATIENT)
Dept: SURGERY | Facility: MEDICAL CENTER | Age: 74
End: 2019-07-24
Payer: MEDICARE

## 2019-07-24 ENCOUNTER — HOSPITAL ENCOUNTER (OUTPATIENT)
Facility: MEDICAL CENTER | Age: 74
End: 2019-07-24
Attending: SURGERY | Admitting: SURGERY
Payer: MEDICARE

## 2019-07-24 VITALS
TEMPERATURE: 97.2 F | HEART RATE: 69 BPM | OXYGEN SATURATION: 93 % | BODY MASS INDEX: 24.4 KG/M2 | WEIGHT: 180.12 LBS | HEIGHT: 72 IN | RESPIRATION RATE: 20 BRPM

## 2019-07-24 DIAGNOSIS — G89.18 POST-OPERATIVE PAIN: ICD-10-CM

## 2019-07-24 LAB — EKG IMPRESSION: NORMAL

## 2019-07-24 PROCEDURE — 160002 HCHG RECOVERY MINUTES (STAT): Performed by: SURGERY

## 2019-07-24 PROCEDURE — 700105 HCHG RX REV CODE 258: Performed by: SURGERY

## 2019-07-24 PROCEDURE — 93005 ELECTROCARDIOGRAM TRACING: CPT | Performed by: SURGERY

## 2019-07-24 PROCEDURE — 160035 HCHG PACU - 1ST 60 MINS PHASE I: Performed by: SURGERY

## 2019-07-24 PROCEDURE — 700101 HCHG RX REV CODE 250: Performed by: ANESTHESIOLOGY

## 2019-07-24 PROCEDURE — 503366 HCHG TROCAR, 12X150 KII FIOS Z THR: Performed by: SURGERY

## 2019-07-24 PROCEDURE — 501838 HCHG SUTURE GENERAL: Performed by: SURGERY

## 2019-07-24 PROCEDURE — 160009 HCHG ANES TIME/MIN: Performed by: SURGERY

## 2019-07-24 PROCEDURE — 160025 RECOVERY II MINUTES (STATS): Performed by: SURGERY

## 2019-07-24 PROCEDURE — C1781 MESH (IMPLANTABLE): HCPCS | Performed by: SURGERY

## 2019-07-24 PROCEDURE — 502714 HCHG ROBOTIC SURGERY SERVICES: Performed by: SURGERY

## 2019-07-24 PROCEDURE — 500002 HCHG ADHESIVE, DERMABOND: Performed by: SURGERY

## 2019-07-24 PROCEDURE — A9270 NON-COVERED ITEM OR SERVICE: HCPCS | Performed by: ANESTHESIOLOGY

## 2019-07-24 PROCEDURE — 160031 HCHG SURGERY MINUTES - 1ST 30 MINS LEVEL 5: Performed by: SURGERY

## 2019-07-24 PROCEDURE — 160042 HCHG SURGERY MINUTES - EA ADDL 1 MIN LEVEL 5: Performed by: SURGERY

## 2019-07-24 PROCEDURE — 160048 HCHG OR STATISTICAL LEVEL 1-5: Performed by: SURGERY

## 2019-07-24 PROCEDURE — 93010 ELECTROCARDIOGRAM REPORT: CPT | Performed by: INTERNAL MEDICINE

## 2019-07-24 PROCEDURE — 160036 HCHG PACU - EA ADDL 30 MINS PHASE I: Performed by: SURGERY

## 2019-07-24 PROCEDURE — 700101 HCHG RX REV CODE 250: Performed by: SURGERY

## 2019-07-24 PROCEDURE — 700111 HCHG RX REV CODE 636 W/ 250 OVERRIDE (IP): Performed by: ANESTHESIOLOGY

## 2019-07-24 PROCEDURE — 160022 HCHG BLOCK: Performed by: SURGERY

## 2019-07-24 PROCEDURE — 700102 HCHG RX REV CODE 250 W/ 637 OVERRIDE(OP): Performed by: ANESTHESIOLOGY

## 2019-07-24 PROCEDURE — 500868 HCHG NEEDLE, SURGI(VARES): Performed by: SURGERY

## 2019-07-24 PROCEDURE — 160046 HCHG PACU - 1ST 60 MINS PHASE II: Performed by: SURGERY

## 2019-07-24 DEVICE — MESH VENTRALIGHT ST 4.5IN 1EA/CA: Type: IMPLANTABLE DEVICE | Status: FUNCTIONAL

## 2019-07-24 RX ORDER — CELECOXIB 200 MG/1
400 CAPSULE ORAL ONCE
Status: COMPLETED | OUTPATIENT
Start: 2019-07-24 | End: 2019-07-24

## 2019-07-24 RX ORDER — ONDANSETRON 2 MG/ML
INJECTION INTRAMUSCULAR; INTRAVENOUS PRN
Status: DISCONTINUED | OUTPATIENT
Start: 2019-07-24 | End: 2019-07-24 | Stop reason: SURG

## 2019-07-24 RX ORDER — DEXAMETHASONE SODIUM PHOSPHATE 4 MG/ML
INJECTION, SOLUTION INTRA-ARTICULAR; INTRALESIONAL; INTRAMUSCULAR; INTRAVENOUS; SOFT TISSUE PRN
Status: DISCONTINUED | OUTPATIENT
Start: 2019-07-24 | End: 2019-07-24 | Stop reason: HOSPADM

## 2019-07-24 RX ORDER — IBUPROFEN 200 MG
600 TABLET ORAL EVERY 6 HOURS PRN
COMMUNITY
End: 2022-02-16

## 2019-07-24 RX ORDER — ACETAMINOPHEN 500 MG
1000 TABLET ORAL ONCE
Status: COMPLETED | OUTPATIENT
Start: 2019-07-24 | End: 2019-07-24

## 2019-07-24 RX ORDER — HYDRALAZINE HYDROCHLORIDE 20 MG/ML
5 INJECTION INTRAMUSCULAR; INTRAVENOUS
Status: DISCONTINUED | OUTPATIENT
Start: 2019-07-24 | End: 2019-07-24 | Stop reason: HOSPADM

## 2019-07-24 RX ORDER — METOPROLOL TARTRATE 1 MG/ML
1 INJECTION, SOLUTION INTRAVENOUS
Status: DISCONTINUED | OUTPATIENT
Start: 2019-07-24 | End: 2019-07-24 | Stop reason: HOSPADM

## 2019-07-24 RX ORDER — GABAPENTIN 300 MG/1
300 CAPSULE ORAL ONCE
Status: COMPLETED | OUTPATIENT
Start: 2019-07-24 | End: 2019-07-24

## 2019-07-24 RX ORDER — SODIUM CHLORIDE, SODIUM LACTATE, POTASSIUM CHLORIDE, CALCIUM CHLORIDE 600; 310; 30; 20 MG/100ML; MG/100ML; MG/100ML; MG/100ML
INJECTION, SOLUTION INTRAVENOUS CONTINUOUS
Status: DISCONTINUED | OUTPATIENT
Start: 2019-07-24 | End: 2019-07-24 | Stop reason: HOSPADM

## 2019-07-24 RX ORDER — ONDANSETRON 2 MG/ML
4 INJECTION INTRAMUSCULAR; INTRAVENOUS
Status: DISCONTINUED | OUTPATIENT
Start: 2019-07-24 | End: 2019-07-24 | Stop reason: HOSPADM

## 2019-07-24 RX ORDER — HALOPERIDOL 5 MG/ML
1 INJECTION INTRAMUSCULAR
Status: DISCONTINUED | OUTPATIENT
Start: 2019-07-24 | End: 2019-07-24 | Stop reason: HOSPADM

## 2019-07-24 RX ORDER — OXYCODONE HCL 5 MG/5 ML
10 SOLUTION, ORAL ORAL
Status: COMPLETED | OUTPATIENT
Start: 2019-07-24 | End: 2019-07-24

## 2019-07-24 RX ORDER — OXYCODONE HCL 5 MG/5 ML
5 SOLUTION, ORAL ORAL
Status: COMPLETED | OUTPATIENT
Start: 2019-07-24 | End: 2019-07-24

## 2019-07-24 RX ORDER — CEFAZOLIN SODIUM 1 G/3ML
INJECTION, POWDER, FOR SOLUTION INTRAMUSCULAR; INTRAVENOUS PRN
Status: DISCONTINUED | OUTPATIENT
Start: 2019-07-24 | End: 2019-07-24 | Stop reason: SURG

## 2019-07-24 RX ORDER — BUPIVACAINE HYDROCHLORIDE AND EPINEPHRINE 2.5; 5 MG/ML; UG/ML
INJECTION, SOLUTION EPIDURAL; INFILTRATION; INTRACAUDAL; PERINEURAL PRN
Status: DISCONTINUED | OUTPATIENT
Start: 2019-07-24 | End: 2019-07-24 | Stop reason: SURG

## 2019-07-24 RX ORDER — MAGNESIUM HYDROXIDE 1200 MG/15ML
LIQUID ORAL
Status: COMPLETED | OUTPATIENT
Start: 2019-07-24 | End: 2019-07-24

## 2019-07-24 RX ORDER — OXYCODONE HCL 10 MG/1
10 TABLET, FILM COATED, EXTENDED RELEASE ORAL ONCE
Status: COMPLETED | OUTPATIENT
Start: 2019-07-24 | End: 2019-07-24

## 2019-07-24 RX ORDER — DIPHENHYDRAMINE HYDROCHLORIDE 50 MG/ML
12.5 INJECTION INTRAMUSCULAR; INTRAVENOUS
Status: DISCONTINUED | OUTPATIENT
Start: 2019-07-24 | End: 2019-07-24 | Stop reason: HOSPADM

## 2019-07-24 RX ORDER — BUPIVACAINE HYDROCHLORIDE AND EPINEPHRINE 5; 5 MG/ML; UG/ML
INJECTION, SOLUTION EPIDURAL; INTRACAUDAL; PERINEURAL
Status: DISCONTINUED | OUTPATIENT
Start: 2019-07-24 | End: 2019-07-24 | Stop reason: HOSPADM

## 2019-07-24 RX ORDER — OXYCODONE HYDROCHLORIDE AND ACETAMINOPHEN 5; 325 MG/1; MG/1
1-2 TABLET ORAL EVERY 4 HOURS PRN
Qty: 30 TAB | Refills: 0 | Status: SHIPPED | OUTPATIENT
Start: 2019-07-24 | End: 2019-07-29

## 2019-07-24 RX ADMIN — SUGAMMADEX 200 MG: 100 INJECTION, SOLUTION INTRAVENOUS at 17:12

## 2019-07-24 RX ADMIN — MIDAZOLAM HYDROCHLORIDE 2 MG: 1 INJECTION, SOLUTION INTRAMUSCULAR; INTRAVENOUS at 16:05

## 2019-07-24 RX ADMIN — OXYCODONE HYDROCHLORIDE 10 MG: 10 TABLET, FILM COATED, EXTENDED RELEASE ORAL at 15:11

## 2019-07-24 RX ADMIN — CEFAZOLIN 2 G: 330 INJECTION, POWDER, FOR SOLUTION INTRAMUSCULAR; INTRAVENOUS at 16:11

## 2019-07-24 RX ADMIN — ACETAMINOPHEN 1000 MG: 500 TABLET ORAL at 15:11

## 2019-07-24 RX ADMIN — CELECOXIB 400 MG: 200 CAPSULE ORAL at 15:11

## 2019-07-24 RX ADMIN — FENTANYL CITRATE 50 MCG: 0.05 INJECTION, SOLUTION INTRAMUSCULAR; INTRAVENOUS at 18:08

## 2019-07-24 RX ADMIN — SODIUM CHLORIDE, POTASSIUM CHLORIDE, SODIUM LACTATE AND CALCIUM CHLORIDE: 600; 310; 30; 20 INJECTION, SOLUTION INTRAVENOUS at 14:07

## 2019-07-24 RX ADMIN — GABAPENTIN 300 MG: 300 CAPSULE ORAL at 15:11

## 2019-07-24 RX ADMIN — SODIUM CHLORIDE, POTASSIUM CHLORIDE, SODIUM LACTATE AND CALCIUM CHLORIDE: 600; 310; 30; 20 INJECTION, SOLUTION INTRAVENOUS at 16:06

## 2019-07-24 RX ADMIN — ONDANSETRON 4 MG: 2 INJECTION INTRAMUSCULAR; INTRAVENOUS at 16:46

## 2019-07-24 RX ADMIN — ROCURONIUM BROMIDE 100 MG: 10 INJECTION, SOLUTION INTRAVENOUS at 16:06

## 2019-07-24 RX ADMIN — DEXAMETHASONE SODIUM PHOSPHATE 8 MG: 4 INJECTION, SOLUTION INTRA-ARTICULAR; INTRALESIONAL; INTRAMUSCULAR; INTRAVENOUS; SOFT TISSUE at 16:46

## 2019-07-24 RX ADMIN — PROPOFOL 150 MG: 10 INJECTION, EMULSION INTRAVENOUS at 16:06

## 2019-07-24 RX ADMIN — OXYCODONE HYDROCHLORIDE 10 MG: 5 SOLUTION ORAL at 18:12

## 2019-07-24 RX ADMIN — BUPIVACAINE HYDROCHLORIDE AND EPINEPHRINE 50 ML: 2.5; 5 INJECTION, SOLUTION EPIDURAL; INFILTRATION; INTRACAUDAL; PERINEURAL at 16:17

## 2019-07-24 RX ADMIN — FENTANYL CITRATE 100 MCG: 50 INJECTION, SOLUTION INTRAMUSCULAR; INTRAVENOUS at 16:06

## 2019-07-24 ASSESSMENT — PAIN SCALES - GENERAL: PAIN_LEVEL: 0

## 2019-07-24 NOTE — PROGRESS NOTES
Med rec updated and complete  Allergies reviewed  Pt reports no prescription medications or vitamins  Pt reports no antibiotics in the last 2 weeks.

## 2019-07-24 NOTE — ANESTHESIA PREPROCEDURE EVALUATION
Relevant Problems   No relevant active problems       Physical Exam    Airway   Mallampati: II  TM distance: >3 FB  Neck ROM: full       Cardiovascular - normal exam  Rhythm: regular  Rate: normal  (-) murmur     Dental - normal exam         Pulmonary - normal exam  Breath sounds clear to auscultation     Abdominal    Neurological - normal exam                 Anesthesia Plan    ASA 2       Plan - general and peripheral nerve block     Peripheral nerve block will be post-op pain control  Airway plan will be ETT        Induction: intravenous    Postoperative Plan: Postoperative administration of opioids is intended.    Pertinent diagnostic labs and testing reviewed    Informed Consent:    Anesthetic plan and risks discussed with patient.    Use of blood products discussed with: patient whom consented to blood products.

## 2019-07-24 NOTE — ANESTHESIA PROCEDURE NOTES
Peripheral Block  Performed by: AINSLEY AMAYA  Authorized by: AINSLEY AMAYA     Patient Location:  OR  Start Time:  7/24/2019 4:13 PM  End Time:  7/24/2019 4:27 PM  Reason for Block: at surgeon's request and post-op pain management    patient identified, IV checked, site marked, risks and benefits discussed, surgical consent, monitors and equipment checked, pre-op evaluation and timeout performed    Patient Position:  Supine  Prep: ChloraPrep    Monitoring:  Heart rate and cardiac monitor  Block Region:  Trunk  Trunk - Block Type:  Rectus sheath plane block - TAP block    Laterality:  Bilateral  Procedures: ultrasound guided  Image captured, interpreted and electronically stored.    Block Type:  Single-shot  Needle Localization:  Ultrasound guidance  Catheter at Skin Depth:  2  Injection Assessment:  Negative aspiration for heme and no paresthesia on injection   US Guided Transversus Abdominis Plane (TAP) Block   US probe placed at the anterior axillary line between the costal margin and iliac crest in an axial plane. External Oblique, Internal Oblique (IO) and Transversis Abdominis (TA) muscles identified.  Needle inserted anteromedial to probe in an in plane approach and advanced under direct visualization to TAP between IO and TA muscled.  After negative aspiration LA injected with ease and visualized spreading in TAP plane.

## 2019-07-24 NOTE — ANESTHESIA PROCEDURE NOTES
Airway  Date/Time: 7/24/2019 4:11 PM  Performed by: AINSLEY AMAYA  Authorized by: AINSLEY AMAYA     Location:  OR  Urgency:  Elective  Indications for Airway Management:  Anesthesia  Spontaneous Ventilation: absent    Sedation Level:  Deep  Preoxygenated: Yes    Patient Position:  Sniffing  Final Airway Type:  Endotracheal airway  Final Endotracheal Airway:  ETT  Cuffed: Yes    Technique Used for Successful ETT Placement:  Video laryngoscopy  Insertion Site:  Oral  Blade Type:  Dariel  Laryngoscope Blade/Videolaryngoscope Blade Size:  3  ETT Size (mm):  7.5  Measured from:  Teeth  Placement Verified by: auscultation and capnometry    Number of Attempts at Approach:  1   Difficult airway, poor view with manual laryngoscopy,  Required glidescope

## 2019-07-25 NOTE — OR SURGEON
Immediate Post OP Note    PreOp Diagnosis: ventral hernia    PostOp Diagnosis: ventral hernia-incarcerated    Procedure(s):  Robotic Incarcerated Ventral Hernia Repair With Mesh - Wound Class: Clean    Surgeon(s):  MELBA Walton M.D.    Anesthesiologist/Type of Anesthesia:  Anesthesiologist: Jimenez Knutson M.D./General    Surgical Staff:  Circulator: Faith Krishna R.N.  Scrub Person: David Pizarro    Specimens removed if any:  * No specimens in log *    Estimated Blood Loss: 25 cc    Findings: incarcerated ventral hernia    Complications: none        7/24/2019 5:16 PM Jose Lanza M.D.

## 2019-07-25 NOTE — OP REPORT
DATE OF SERVICE:  07/24/2019    SURGEON:  Jose Lanza MD    ASSISTANT:  Aneudy Bernard MD    PREOPERATIVE DIAGNOSIS:  Ventral hernia.    POSTOPERATIVE DIAGNOSIS:  Ventral hernia with incarcerated contents.    PROCEDURE PERFORMED:  Robotic repair of incarcerated ventral hernia with mesh.    ANESTHESIA:  General endotracheal anesthesia.    ANESTHESIOLOGIST:  Jimenez Knutson MD    INDICATIONS:  A 74-year-old man with a symptomatic ventral hernia, which   clinically did have some incarcerated contents.  Repair is indicated.    DESCRIPTION OF PROCEDURE:  Procedure was discussed in detail with the patient   including the use of the surgical robot, potential for converting to an open   procedure, associated risk of bleeding, infection, abscess, and hematoma.  I   also discussed use of mesh, risk of recurrence, and risk of injury to   intraperitoneal organs.  He understood all the above and wished to proceed.    He was placed under anesthesia by Dr. Knutson.  His abdomen was prepped and   draped with chlorhexidine prep and sterile drapes.  After a timeout, a left   subcostal incision was made and through this incision, the peritoneal cavity   was entered with a Veress needle.  Low pressure was confirmed and CO2   insufflation was used to achieve a pneumoperitoneum of 15 mmHg.  Through the   same incision, an 8 mm da Suzie port was placed.  Laparoscope was inserted and   under direct visualization, a 12 mm left lateral port and a left lower   quadrant 8 mm da Suzie port were placed.  Robot was docked and instruments   were placed.  A relatively small ventral hernia with some incarcerated omentum   was identified.  The incarcerated contents were reduced and some   preperitoneal fat was removed as well.  Once this was accomplished, the hernia   defect was approximated with a 15 cm Stratafix suture.  Subsequent to this, a   4.5 inch Ventralex round mesh was placed centered on the hernia defect.  It   was sutured  in place with a total of two 30 cm Stratafix sutures.  All 3   needles were then removed.  The 12 mm port was removed and the fascia at that   site was approximated with an 0 Vicryl stitch using the Endoclose device.  The   remaining ports were removed and pneumoperitoneum was released.  The skin on   all incision was approximated with 4-0 Vicryl sutures.  Dermabond was placed.    The patient tolerated the procedure well without apparent complication.    Final counts were reported as correct.       ____________________________________     BHARAT MEADOWS MD    AHNIGEL / NTS    DD:  07/24/2019 17:20:17  DT:  07/24/2019 17:29:27    D#:  [id number]  Job#:  [id number]    cc: Ryan Omalley MD

## 2019-07-25 NOTE — ANESTHESIA POSTPROCEDURE EVALUATION
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    Procedure Summary     Date:  07/24/19 Room / Location:  [place] [address] / SURGERY [facility]    Anesthesia Start:  1606 Anesthesia Stop:  1723    Procedure:  Robotic Incarcerated Ventral Hernia Repair With Mesh (Abdomen) Diagnosis:  (Incarcerated Ventral Hernia)    Surgeon:  Jose Lanza M.D. Responsible Provider:  Jimenez Knutson M.D.    Anesthesia Type:  general, peripheral nerve block ASA Status:  2          Final Anesthesia Type: general, peripheral nerve block  Last vitals  BP   NIBP: 159/86    Temp   36.7 °C (98.1 °F)    Pulse   Pulse: 74   Resp   17    SpO2   97 %      Anesthesia Post Evaluation    Patient location during evaluation: PACU  Patient participation: complete - patient participated  Level of consciousness: awake and alert  Pain score: 0    Airway patency: patent  Anesthetic complications: no  Cardiovascular status: hemodynamically stable  Respiratory status: acceptable  Hydration status: euvolemic    PONV: none

## 2019-07-25 NOTE — OR NURSING
The pt is awake and oriented. Respirations are regular and easy. Pain is controlled, the pt is comfortable. Incisions dry and intact.

## 2019-07-25 NOTE — PROGRESS NOTES
Received from recovery, pt AOx4, amb steady gait gurney to chair. Lap stab x3 with gauze drsg c/d/i, rates pain 4-5/10. Reviewed dscharge criteria able tovoid, pt acknowledges plan

## 2019-07-25 NOTE — ANESTHESIA TIME REPORT
Anesthesia Start and Stop Event Times     Date Time Event    7/24/2019 1606 Anesthesia Start     1723 Anesthesia Stop        Responsible Staff  07/24/19    Name Role Begin End    Jimenez Knutson M.D. Anesth 1606 1723        Preop Diagnosis (Free Text):  Pre-op Diagnosis     INCARCERATED VENTRAL HERNIA        Preop Diagnosis (Codes):  Diagnosis Information     Diagnosis Code(s):         Post op Diagnosis  Ventral hernia      Premium Reason  A. 3PM - 7AM    Comments:

## 2019-07-25 NOTE — DISCHARGE INSTRUCTIONS
ACTIVITY: Rest and take it easy for the first 24 hours.  A responsible adult is recommended to remain with you during that time.  It is normal to feel sleepy.  We encourage you to not do anything that requires balance, judgment or coordination.    MILD FLU-LIKE SYMPTOMS ARE NORMAL. YOU MAY EXPERIENCE GENERALIZED MUSCLE ACHES, THROAT IRRITATION, HEADACHE AND/OR SOME NAUSEA.    FOR 24 HOURS DO NOT:  Drive, operate machinery or run household appliances.  Drink beer or alcoholic beverages.   Make important decisions or sign legal documents.    SPECIAL INSTRUCTIONS: follow Dr. Lanza's instructions    DIET: To avoid nausea, slowly advance diet as tolerated, avoiding spicy or greasy foods for the first day.  Add more substantial food to your diet according to your physician's instructions.  Babies can be fed formula or breast milk as soon as they are hungry.  INCREASE FLUIDS AND FIBER TO AVOID CONSTIPATION.    SURGICAL DRESSING/BATHING: follow your surgeon's instruction    FOLLOW-UP APPOINTMENT:  A follow-up appointment should be arranged with your doctor in 1-2 weeks; call to schedule.    You should CALL YOUR PHYSICIAN if you develop:  Fever greater than 101 degrees F.  Pain not relieved by medication, or persistent nausea or vomiting.  Excessive bleeding (blood soaking through dressing) or unexpected drainage from the wound.  Extreme redness or swelling around the incision site, drainage of pus or foul smelling drainage.  Inability to urinate or empty your bladder within 8 hours.  Problems with breathing or chest pain.    You should call 911 if you develop problems with breathing or chest pain.  If you are unable to contact your doctor or surgical center, you should go to the nearest emergency room or urgent care center.  Physician's telephone #: [phone]    If any questions arise, call your doctor.  If your doctor is not available, please feel free to call the Surgical Center at [phone].  The Center is open  Monday through Friday from 7AM to 7PM.  You can also call the HEALTH HOTLINE open 24 hours/day, 7 days/week and speak to a nurse at [phone], or toll free at [phone].    A registered nurse may call you a few days after your surgery to see how you are doing after your procedure.    MEDICATIONS: Resume taking daily medication.  Take prescribed pain medication with food.  If no medication is prescribed, you may take non-aspirin pain medication if needed.  PAIN MEDICATION CAN BE VERY CONSTIPATING.  Take a stool softener or laxative such as senokot, pericolace, or milk of magnesia if needed.    Prescription given for percocet.  Last pain medication given at 6:12.    If your physician has prescribed pain medication that includes Acetaminophen (Tylenol), do not take additional Acetaminophen (Tylenol) while taking the prescribed medication.    Depression / Suicide Risk    As you are discharged from this [facility] facility, it is important to learn how to keep safe from harming yourself.    Recognize the warning signs:  · Abrupt changes in personality, positive or negative- including increase in energy   · Giving away possessions  · Change in eating patterns- significant weight changes-  positive or negative  · Change in sleeping patterns- unable to sleep or sleeping all the time   · Unwillingness or inability to communicate  · Depression  · Unusual sadness, discouragement and loneliness  · Talk of wanting to die  · Neglect of personal appearance   · Rebelliousness- reckless behavior  · Withdrawal from people/activities they love  · Confusion- inability to concentrate     If you or a loved one observes any of these behaviors or has concerns about self-harm, here's what you can do:  · Talk about it- your feelings and reasons for harming yourself  · Remove any means that you might use to hurt yourself (examples: pills, rope, extension cords, firearm)  · Get professional help from the community (Mental Health,  Substance Abuse, psychological counseling)  · Do not be alone:Call your Safe Contact- someone whom you trust who will be there for you.  · Call your local CRISIS HOTLINE 216-9256 or 828-261-7646  · Call your local Children's Mobile Crisis Response Team [place] (597) 624-7842 or www.Urbster  · Call the toll free National Suicide Prevention Hotlines   · National Suicide Prevention Lifeline 060-453-OFYZ (1320)  · National Hope Line Network 800-SUICIDE (642-3327)

## 2019-07-25 NOTE — ANESTHESIA QCDR
2019 [facility] Clinical Data Registry (for Quality Improvement)     Postoperative nausea/vomiting risk protocol (Adult = 18 yrs and Pediatric 3-17 yrs)- (430 and 463)  General inhalation anesthetic (NOT TIVA) with PONV risk factors: Yes  Provision of anti-emetic therapy with at least 2 different classes of agents: Yes   Patient DID NOT receive anti-emetic therapy and reason is documented in Medical Record:  N/A    Multimodal Pain Management- (AQI59)  Patient undergoing Elective Surgery (i.e. Outpatient, or ASC, or Prescheduled Surgery prior to Hospital Admission): Yes  Use of Multimodal Pain Management, two or more drugs and/or interventions, NOT including systemic opioids: Yes   Exception: Documented allergy to multiple classes of analgesics:  N/A    PACU assessment of acute postoperative pain prior to Anesthesia Care End- Applies to Patients Age = 18- (ABG7)  Initial PACU pain score is which of the following: < 7/10  Patient unable to report pain score: N/A    Post-anesthetic transfer of care checklist/protocol to PACU/ICU- (426 and 427)  Upon conclusion of case, patient transferred to which of the following locations: PACU/Non-ICU  Use of transfer checklist/protocol: Yes  Exclusion: Service Performed in Patient Hospital Room (and thus did not require transfer): N/A    PACU Reintubation- (AQI31)  General anesthesia requiring endotracheal intubation (ETT) along with subsequent extubation in OR or PACU: No  Required reintubation in the PACU: N/A  Extubation was a planned trial documented in the medical record prior to removal of the original airway device: N/A    Unplanned admission to ICU related to anesthesia service up through end of PACU care- (MD51)  Unplanned admission to ICU (not initially anticipated at anesthesia start time): No

## 2019-09-06 ENCOUNTER — PATIENT OUTREACH (OUTPATIENT)
Dept: HEALTH INFORMATION MANAGEMENT | Facility: OTHER | Age: 74
End: 2019-09-06

## 2019-09-06 NOTE — PROGRESS NOTES
Outcome: Left Message    Please transfer to Patient Outreach Team at [phone] when patient returns call.    Attempt # 1

## 2019-09-06 NOTE — PROGRESS NOTES
Outcome: Left Message August Call list [facility] AWV     Please transfer to Patient Outreach Team at [phone] when patient returns call.    HealthConnect Verified: yes    Attempt # 5

## 2019-09-06 NOTE — OR NURSING
Patient allergies and NPO status verified. Belongings at bedside, will lock-up belongings prior going to sx. Patient verbalizes understanding of pain scale, expected course of stay and plan of care. Surgical site verified with patient. IV access established.  Call light within reach. No further needs at this time. Hourly rounding in place.    Doctor's Office

## 2019-10-30 NOTE — PROGRESS NOTES
Outcome: Left Message to introduce SCPPA program --  Need for AWV and AHA    Please transfer to Patient Outreach Team at [phone] when patient returns call.    HealthConnect Verified: yes    Attempt # 1    Update [facility] with email and PCP

## 2019-11-14 ENCOUNTER — TELEPHONE (OUTPATIENT)
Dept: MEDICAL GROUP | Facility: MEDICAL CENTER | Age: 74
End: 2019-11-14

## 2019-11-14 ENCOUNTER — NON-PROVIDER VISIT (OUTPATIENT)
Dept: MEDICAL GROUP | Facility: MEDICAL CENTER | Age: 74
End: 2019-11-14
Payer: MEDICARE

## 2019-11-14 DIAGNOSIS — Z23 NEED FOR VACCINATION: ICD-10-CM

## 2019-11-14 DIAGNOSIS — R35.0 URINARY FREQUENCY: ICD-10-CM

## 2019-11-14 PROCEDURE — 90662 IIV NO PRSV INCREASED AG IM: CPT | Performed by: FAMILY MEDICINE

## 2019-11-14 PROCEDURE — G0008 ADMIN INFLUENZA VIRUS VAC: HCPCS | Performed by: FAMILY MEDICINE

## 2019-11-14 NOTE — TELEPHONE ENCOUNTER
1. Caller Name: Chi Ashley Dr.                                                Call Back Number: [phone] (home) [phone] (work)      Patient approves a detailed voicemail message: no    2. SPECIFIC Action To Be Taken: Referral pending, please sign.    3. Diagnosis/Clinical Reason for Request: Urinary Frequency, pt. States benign prostatic hyperplasia    4. Specialty & Provider Name/Lab/Imaging Location: Urology    5. Is appointment scheduled for requested order/referral: no    Patient was not informed they will receive a return phone call from the office ONLY if there are any questions before processing their request. Advised to call back if they haven't received a call from the referral department in 5 days.    Pt. States he has also seen Dr. Roberto Dennis for interventional Radiology for this issue. Records requested.

## 2019-11-14 NOTE — LETTER
Critical access hospital  Ryan Omalley M.D.  [address]  [place] [address]  Fax: [phone]   Authorization for Release/Disclosure of   Protected Health Information   Name: PRESTON BRITO DR. : 1945 SSN: [id number]   Address: [address] [place], [place] [address] [address] Phone:    [phone] (home) [phone] (work)   I authorize the entity listed below to release/disclose the PHI below to:   Critical access hospital/Ryan Omalley M.D. and Ryan Omalley M.D.   Provider or Entity Name:Dr. Roberto Dennis     Address   [facility]   Phone:[phone]      Fax:[phone]     Reason for request: continuity of care   Information to be released:    [  ] LAST COLONOSCOPY,  including any PATH REPORT and follow-up  [  ] LAST FIT/COLOGUARD RESULT [  ] LAST DEXA  [  ] LAST MAMMOGRAM  [  ] LAST PAP  [  ] LAST LABS [  ] RETINA EXAM REPORT  [  ] IMMUNIZATION RECORDS  [ xxx ] Release all info      [  ] Check here and initial the line next to each item to release ALL health information INCLUDING  _____ Care and treatment for drug and / or alcohol abuse  _____ HIV testing, infection status, or AIDS  _____ Genetic Testing    DATES OF SERVICE OR TIME PERIOD TO BE DISCLOSED: _____________  I understand and acknowledge that:  * This Authorization may be revoked at any time by you in writing, except if your health information has already been used or disclosed.  * Your health information that will be used or disclosed as a result of you signing this authorization could be re-disclosed by the recipient. If this occurs, your re-disclosed health information may no longer be protected by State or Federal laws.  * You may refuse to sign this Authorization. Your refusal will not affect your ability to obtain treatment.  * This Authorization becomes effective upon signing and will  on (date) __________.      If no date is indicated, this Authorization will  one (1) year from the signature date.       Name: Chi Ashley Dr.    Signature:Continuation of Care   Date:     11/14/2019       PLEASE FAX REQUESTED RECORDS BACK TO: [phone]

## 2019-11-14 NOTE — NON-PROVIDER
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

## 2019-12-04 NOTE — PROGRESS NOTES
Outcome: Left Message / AHA and Introduce to SCPPA Program    Please transfer to Patient Outreach Team at [phone] when patient returns call.    HealthConnect Verified: yes    Attempt # 2

## 2020-01-22 NOTE — PROGRESS NOTES
Outcome: Left Message for HBD. SCPPA . AHA    Please transfer to Patient Outreach Team at [phone] when patient returns call.    HealthConnect Verified: yes    Attempt # 3      MBR  [id number]  Issue [id number]

## 2020-05-22 ENCOUNTER — TELEPHONE (OUTPATIENT)
Dept: MEDICAL GROUP | Facility: MEDICAL CENTER | Age: 75
End: 2020-05-22

## 2020-05-22 NOTE — TELEPHONE ENCOUNTER
Pt.here asking for a covid-19 antibody test.  Ag informed pt that he has not been in almost 2 years and may need an appointment.  Pt. Received a letter reg DR. Omalley seen less patients and the need for him to est care with a new PCP.  Mimi, would you order the lab test? Please advise...

## 2020-05-29 NOTE — TELEPHONE ENCOUNTER
I am glad to order, just let him know my info about the test and if he still wants it let me know and I'll put in the order. Thanks! Mimi

## 2020-06-02 NOTE — TELEPHONE ENCOUNTER
Phone Number Called: [phone] (home) [phone] (work)    Call outcome: Did not leave a detailed message. Requested patient to call back.    Message: Lab orders/Covid19 antibody

## 2020-06-23 ENCOUNTER — HOSPITAL ENCOUNTER (OUTPATIENT)
Dept: LAB | Facility: MEDICAL CENTER | Age: 75
End: 2020-06-23
Attending: THORACIC SURGERY (CARDIOTHORACIC VASCULAR SURGERY)
Payer: MEDICARE

## 2020-06-23 LAB — SARS-COV-2 AB SERPL QL IA: NORMAL

## 2020-06-23 PROCEDURE — 36415 COLL VENOUS BLD VENIPUNCTURE: CPT

## 2020-06-23 PROCEDURE — 86769 SARS-COV-2 COVID-19 ANTIBODY: CPT

## 2020-06-25 NOTE — TELEPHONE ENCOUNTER
Phone Number Called: [phone] (home) [phone] (work)    Call outcome: Spoke to patient regarding message below.    Message: completed antibody test for Covid19.

## 2020-08-19 ENCOUNTER — HOSPITAL ENCOUNTER (OUTPATIENT)
Dept: LAB | Facility: MEDICAL CENTER | Age: 75
End: 2020-08-19
Attending: UROLOGY
Payer: MEDICARE

## 2020-08-19 LAB
ALBUMIN SERPL BCP-MCNC: 4.1 G/DL (ref 3.2–4.9)
ALBUMIN/GLOB SERPL: 1.4 G/DL
ALP SERPL-CCNC: 80 U/L (ref 30–99)
ALT SERPL-CCNC: 25 U/L (ref 2–50)
ANION GAP SERPL CALC-SCNC: 9 MMOL/L (ref 7–16)
AST SERPL-CCNC: 16 U/L (ref 12–45)
BILIRUB SERPL-MCNC: 0.4 MG/DL (ref 0.1–1.5)
BUN SERPL-MCNC: 23 MG/DL (ref 8–22)
CALCIUM SERPL-MCNC: 9.1 MG/DL (ref 8.5–10.5)
CHLORIDE SERPL-SCNC: 101 MMOL/L (ref 96–112)
CO2 SERPL-SCNC: 27 MMOL/L (ref 20–33)
CREAT SERPL-MCNC: 1.46 MG/DL (ref 0.5–1.4)
GLOBULIN SER CALC-MCNC: 2.9 G/DL (ref 1.9–3.5)
GLUCOSE SERPL-MCNC: 104 MG/DL (ref 65–99)
POTASSIUM SERPL-SCNC: 4.2 MMOL/L (ref 3.6–5.5)
PROT SERPL-MCNC: 7 G/DL (ref 6–8.2)
PSA SERPL-MCNC: 6.88 NG/ML (ref 0–4)
SODIUM SERPL-SCNC: 137 MMOL/L (ref 135–145)

## 2020-08-19 PROCEDURE — 84153 ASSAY OF PSA TOTAL: CPT

## 2020-08-19 PROCEDURE — 80053 COMPREHEN METABOLIC PANEL: CPT

## 2020-08-19 PROCEDURE — 36415 COLL VENOUS BLD VENIPUNCTURE: CPT

## 2020-10-14 ENCOUNTER — HOSPITAL ENCOUNTER (OUTPATIENT)
Facility: MEDICAL CENTER | Age: 75
End: 2020-10-14
Payer: MEDICARE

## 2020-10-14 LAB
COVID ORDER STATUS COVID19: NORMAL
SARS-COV-2 RNA RESP QL NAA+PROBE: NOTDETECTED
SPECIMEN SOURCE: NORMAL

## 2020-10-14 PROCEDURE — C9803 HOPD COVID-19 SPEC COLLECT: HCPCS

## 2020-10-14 PROCEDURE — U0003 INFECTIOUS AGENT DETECTION BY NUCLEIC ACID (DNA OR RNA); SEVERE ACUTE RESPIRATORY SYNDROME CORONAVIRUS 2 (SARS-COV-2) (CORONAVIRUS DISEASE [COVID-19]), AMPLIFIED PROBE TECHNIQUE, MAKING USE OF HIGH THROUGHPUT TECHNOLOGIES AS DESCRIBED BY CMS-2020-01-R: HCPCS

## 2021-01-11 DIAGNOSIS — Z23 NEED FOR VACCINATION: ICD-10-CM

## 2021-01-23 ENCOUNTER — IMMUNIZATION (OUTPATIENT)
Dept: FAMILY PLANNING/WOMEN'S HEALTH CLINIC | Facility: IMMUNIZATION CENTER | Age: 76
End: 2021-01-23
Attending: INTERNAL MEDICINE
Payer: MEDICARE

## 2021-01-23 DIAGNOSIS — Z23 NEED FOR VACCINATION: ICD-10-CM

## 2021-01-23 DIAGNOSIS — Z23 ENCOUNTER FOR VACCINATION: Primary | ICD-10-CM

## 2021-01-23 PROCEDURE — 91300 PFIZER SARS-COV-2 VACCINE: CPT

## 2021-01-23 PROCEDURE — 0001A PFIZER SARS-COV-2 VACCINE: CPT

## 2021-02-11 PROCEDURE — 0002A PFIZER SARS-COV-2 VACCINE: CPT

## 2021-02-11 PROCEDURE — 91300 PFIZER SARS-COV-2 VACCINE: CPT

## 2021-02-13 ENCOUNTER — IMMUNIZATION (OUTPATIENT)
Dept: FAMILY PLANNING/WOMEN'S HEALTH CLINIC | Facility: IMMUNIZATION CENTER | Age: 76
End: 2021-02-13
Attending: INTERNAL MEDICINE
Payer: MEDICARE

## 2021-02-13 DIAGNOSIS — Z23 ENCOUNTER FOR VACCINATION: Primary | ICD-10-CM

## 2021-02-26 ENCOUNTER — HOSPITAL ENCOUNTER (OUTPATIENT)
Dept: LAB | Facility: MEDICAL CENTER | Age: 76
End: 2021-02-26
Attending: UROLOGY
Payer: MEDICARE

## 2021-02-26 PROCEDURE — 36415 COLL VENOUS BLD VENIPUNCTURE: CPT

## 2021-02-26 PROCEDURE — 84153 ASSAY OF PSA TOTAL: CPT

## 2021-02-27 LAB — PSA SERPL-MCNC: 7.62 NG/ML (ref 0–4)

## 2021-07-07 ENCOUNTER — PATIENT MESSAGE (OUTPATIENT)
Dept: HEALTH INFORMATION MANAGEMENT | Facility: OTHER | Age: 76
End: 2021-07-07

## 2021-09-22 ENCOUNTER — NON-PROVIDER VISIT (OUTPATIENT)
Dept: CARDIOLOGY | Facility: MEDICAL CENTER | Age: 76
End: 2021-09-22
Payer: MEDICARE

## 2021-09-22 ENCOUNTER — TELEPHONE (OUTPATIENT)
Dept: CARDIOLOGY | Facility: MEDICAL CENTER | Age: 76
End: 2021-09-22

## 2021-09-22 DIAGNOSIS — I49.1 ATRIAL ECTOPY: ICD-10-CM

## 2021-09-22 DIAGNOSIS — I49.3 VENTRICULAR ECTOPY: ICD-10-CM

## 2021-09-22 NOTE — TELEPHONE ENCOUNTER
Home enrollment completed for 14 day [employer] MCT Heart monitoring program per Laureano Potts MD.  >moniotr to be shipped to patient by [employer].  >Pending EOS.

## 2021-10-04 ENCOUNTER — TELEPHONE (OUTPATIENT)
Dept: NEPHROLOGY | Facility: MEDICAL CENTER | Age: 76
End: 2021-10-04

## 2021-10-04 ENCOUNTER — TELEPHONE (OUTPATIENT)
Dept: SCHEDULING | Facility: IMAGING CENTER | Age: 76
End: 2021-10-04

## 2021-10-04 NOTE — TELEPHONE ENCOUNTER
PT called regarding issues with the [employer] monitor and he was insistent to talk to someone at [facility] and offered the pt the phone number and he repeatedly refused to take it down and wished to speak to someone like a nurse or someone in the cardio group about the issues that he was experiencing with it      Thank you    -Floyd

## 2021-12-09 ENCOUNTER — TELEPHONE (OUTPATIENT)
Dept: HEALTH INFORMATION MANAGEMENT | Facility: OTHER | Age: 76
End: 2021-12-09

## 2021-12-17 ENCOUNTER — TELEPHONE (OUTPATIENT)
Dept: HEALTH INFORMATION MANAGEMENT | Facility: OTHER | Age: 76
End: 2021-12-17

## 2021-12-17 PROCEDURE — 93268 ECG RECORD/REVIEW: CPT | Performed by: INTERNAL MEDICINE

## 2021-12-17 NOTE — TELEPHONE ENCOUNTER
Outcome: Patient notified that ALETA is not an appointment to establish care with PCP. Patient notified that if he would like to establish care with new PCP it would be separate appointment to establish care ALETA is a new health assessment being offered by [facility] separate from establishing care.     Please transfer to Patient Outreach Team at [phone] when patient returns call.

## 2022-02-02 ENCOUNTER — HOSPITAL ENCOUNTER (OUTPATIENT)
Dept: LAB | Facility: MEDICAL CENTER | Age: 77
End: 2022-02-02
Attending: PHYSICIAN ASSISTANT
Payer: MEDICARE

## 2022-02-02 ENCOUNTER — HOSPITAL ENCOUNTER (OUTPATIENT)
Dept: RADIOLOGY | Facility: MEDICAL CENTER | Age: 77
End: 2022-02-02
Attending: UROLOGY
Payer: MEDICARE

## 2022-02-02 DIAGNOSIS — R97.20 ELEVATED PROSTATE SPECIFIC ANTIGEN (PSA): ICD-10-CM

## 2022-02-02 LAB
ALBUMIN SERPL BCP-MCNC: 4.3 G/DL (ref 3.2–4.9)
ALBUMIN/GLOB SERPL: 1.5 G/DL
ALP SERPL-CCNC: 89 U/L (ref 30–99)
ALT SERPL-CCNC: 18 U/L (ref 2–50)
ANION GAP SERPL CALC-SCNC: 12 MMOL/L (ref 7–16)
AST SERPL-CCNC: 17 U/L (ref 12–45)
BASOPHILS # BLD AUTO: 0.7 % (ref 0–1.8)
BASOPHILS # BLD: 0.05 K/UL (ref 0–0.12)
BILIRUB SERPL-MCNC: 0.4 MG/DL (ref 0.1–1.5)
BUN SERPL-MCNC: 20 MG/DL (ref 8–22)
CALCIUM SERPL-MCNC: 9.6 MG/DL (ref 8.5–10.5)
CHLORIDE SERPL-SCNC: 103 MMOL/L (ref 96–112)
CO2 SERPL-SCNC: 24 MMOL/L (ref 20–33)
CREAT SERPL-MCNC: 1.34 MG/DL (ref 0.5–1.4)
EOSINOPHIL # BLD AUTO: 0.07 K/UL (ref 0–0.51)
EOSINOPHIL NFR BLD: 0.9 % (ref 0–6.9)
ERYTHROCYTE [DISTWIDTH] IN BLOOD BY AUTOMATED COUNT: 42.8 FL (ref 35.9–50)
GLOBULIN SER CALC-MCNC: 2.9 G/DL (ref 1.9–3.5)
GLUCOSE SERPL-MCNC: 98 MG/DL (ref 65–99)
HCT VFR BLD AUTO: 50.2 % (ref 42–52)
HGB BLD-MCNC: 16.2 G/DL (ref 14–18)
IMM GRANULOCYTES # BLD AUTO: 0.02 K/UL (ref 0–0.11)
IMM GRANULOCYTES NFR BLD AUTO: 0.3 % (ref 0–0.9)
LYMPHOCYTES # BLD AUTO: 1.23 K/UL (ref 1–4.8)
LYMPHOCYTES NFR BLD: 16.5 % (ref 22–41)
MCH RBC QN AUTO: 29.4 PG (ref 27–33)
MCHC RBC AUTO-ENTMCNC: 32.3 G/DL (ref 33.7–35.3)
MCV RBC AUTO: 91.1 FL (ref 81.4–97.8)
MONOCYTES # BLD AUTO: 0.48 K/UL (ref 0–0.85)
MONOCYTES NFR BLD AUTO: 6.5 % (ref 0–13.4)
NEUTROPHILS # BLD AUTO: 5.59 K/UL (ref 1.82–7.42)
NEUTROPHILS NFR BLD: 75.1 % (ref 44–72)
NRBC # BLD AUTO: 0 K/UL
NRBC BLD-RTO: 0 /100 WBC
PLATELET # BLD AUTO: 195 K/UL (ref 164–446)
PMV BLD AUTO: 10.5 FL (ref 9–12.9)
POTASSIUM SERPL-SCNC: 4.5 MMOL/L (ref 3.6–5.5)
PROT SERPL-MCNC: 7.2 G/DL (ref 6–8.2)
PSA SERPL-MCNC: 7.3 NG/ML (ref 0–4)
RBC # BLD AUTO: 5.51 M/UL (ref 4.7–6.1)
SODIUM SERPL-SCNC: 139 MMOL/L (ref 135–145)
WBC # BLD AUTO: 7.4 K/UL (ref 4.8–10.8)

## 2022-02-02 PROCEDURE — 700111 HCHG RX REV CODE 636 W/ 250 OVERRIDE (IP): Performed by: RADIOLOGY

## 2022-02-02 PROCEDURE — 72197 MRI PELVIS W/O & W/DYE: CPT | Mod: MH

## 2022-02-02 PROCEDURE — 85025 COMPLETE CBC W/AUTO DIFF WBC: CPT

## 2022-02-02 PROCEDURE — 84153 ASSAY OF PSA TOTAL: CPT

## 2022-02-02 PROCEDURE — A9576 INJ PROHANCE MULTIPACK: HCPCS | Performed by: UROLOGY

## 2022-02-02 PROCEDURE — 80053 COMPREHEN METABOLIC PANEL: CPT

## 2022-02-02 PROCEDURE — 36415 COLL VENOUS BLD VENIPUNCTURE: CPT

## 2022-02-02 PROCEDURE — 700117 HCHG RX CONTRAST REV CODE 255: Performed by: UROLOGY

## 2022-02-02 RX ADMIN — GLUCAGON 1 MG: 1 INJECTION, POWDER, LYOPHILIZED, FOR SOLUTION INTRAMUSCULAR; INTRAVENOUS at 13:35

## 2022-02-02 RX ADMIN — GADOTERIDOL 20 ML: 279.3 INJECTION, SOLUTION INTRAVENOUS at 14:43

## 2022-02-04 PROBLEM — R94.4 DECREASED GFR: Status: RESOLVED | Noted: 2017-09-28 | Resolved: 2022-02-04

## 2022-02-04 PROBLEM — R03.0 ELEVATED BLOOD PRESSURE READING WITHOUT DIAGNOSIS OF HYPERTENSION: Status: ACTIVE | Noted: 2022-02-04

## 2022-02-04 PROBLEM — N18.31 CHRONIC KIDNEY DISEASE (CKD) STAGE G3A/A1, MODERATELY DECREASED GLOMERULAR FILTRATION RATE (GFR) BETWEEN 45-59 ML/MIN/1.73 SQUARE METER AND ALBUMINURIA CREATININE RATIO LESS THAN 30 MG/G: Status: ACTIVE | Noted: 2022-02-04

## 2022-02-04 PROBLEM — R73.03 PRE-DIABETES: Status: ACTIVE | Noted: 2022-02-04

## 2022-02-04 PROBLEM — E78.00 ELEVATED LDL CHOLESTEROL LEVEL: Status: ACTIVE | Noted: 2022-02-04

## 2022-02-16 PROBLEM — Z78.9 STATIN INTOLERANCE: Status: ACTIVE | Noted: 2022-02-16

## 2022-02-16 PROBLEM — J30.2 SEASONAL ALLERGIES: Status: ACTIVE | Noted: 2022-02-16

## 2022-02-23 ENCOUNTER — HOSPITAL ENCOUNTER (OUTPATIENT)
Dept: LAB | Facility: MEDICAL CENTER | Age: 77
End: 2022-02-23
Payer: MEDICARE

## 2022-02-23 DIAGNOSIS — E78.00 ELEVATED LDL CHOLESTEROL LEVEL: ICD-10-CM

## 2022-02-23 LAB
CHOLEST SERPL-MCNC: 206 MG/DL (ref 100–199)
HDLC SERPL-MCNC: 58 MG/DL
LDLC SERPL CALC-MCNC: 122 MG/DL
TRIGL SERPL-MCNC: 129 MG/DL (ref 0–149)

## 2022-02-23 PROCEDURE — 80061 LIPID PANEL: CPT

## 2022-02-23 PROCEDURE — 36415 COLL VENOUS BLD VENIPUNCTURE: CPT

## 2022-05-13 NOTE — DISCHARGE INSTRUCTIONS
ACTIVITY: Rest and take it easy for the first 24 hours.  A responsible adult is recommended to remain with you during that time.  It is normal to feel sleepy.  We encourage you to not do anything that requires balance, judgment or coordination.    MILD FLU-LIKE SYMPTOMS ARE NORMAL. YOU MAY EXPERIENCE GENERALIZED MUSCLE ACHES, THROAT IRRITATION, HEADACHE AND/OR SOME NAUSEA.    FOR 24 HOURS DO NOT:  Drive, operate machinery or run household appliances.  Drink beer or alcoholic beverages.   Make important decisions or sign legal documents.    SPECIAL INSTRUCTIONS: Move ankle in dressing   Keep operative extremity elevated   Keep clean and dry as instucted by Dr Chase  May remove dressing and place bandaid on post op day 3  Monday  Weight bearing as tolerated to operative extremity    DIET: To avoid nausea, slowly advance diet as tolerated, avoiding spicy or greasy foods for the first day.  Add more substantial food to your diet according to your physician's instructions.  Babies can be fed formula or breast milk as soon as they are hungry.  INCREASE FLUIDS AND FIBER TO AVOID CONSTIPATION.    SURGICAL DRESSING/BATHING: ***      FOLLOW-UP APPOINTMENT:  A follow-up appointmen tas scheduled    You should CALL YOUR PHYSICIAN if you develop:  Fever greater than 101 degrees F.  Pain not relieved by medication, or persistent nausea or vomiting.  Excessive bleeding (blood soaking through dressing) or unexpected drainage from the wound.  Extreme redness or swelling around the incision site, drainage of pus or foul smelling drainage.  Inability to urinate or empty your bladder within 8 hours.  Problems with breathing or chest pain.    You should call 911 if you develop problems with breathing or chest pain.  If you are unable to contact your doctor or surgical center, you should go to the nearest emergency room or urgent care center.  Physician's telephone #: [phone]    If any questions arise, call your doctor.  If your doctor  Pt reports someone already called him this morning.   is not available, please feel free to call the Surgical Center at [phone].  The Center is open Monday through Friday from 7AM to 7PM.  You can also call the HEALTH HOTLINE open 24 hours/day, 7 days/week and speak to a nurse at [phone], or toll free at [phone].    A registered nurse may call you a few days after your surgery to see how you are doing after your procedure.    MEDICATIONS: Resume taking daily medication.  Take prescribed pain medication with food.  If no medication is prescribed, you may take non-aspirin pain medication if needed.  PAIN MEDICATION CAN BE VERY CONSTIPATING.  Take a stool softener or laxative such as senokot, pericolace, or milk of magnesia if needed.    Prescription given rat home.  Last pain medication given at .    If your physician has prescribed pain medication that includes Acetaminophen (Tylenol), do not take additional Acetaminophen (Tylenol) while taking the prescribed medication.    Depression / Suicide Risk    As you are discharged from this [facility] facility, it is important to learn how to keep safe from harming yourself.    Recognize the warning signs:  · Abrupt changes in personality, positive or negative- including increase in energy   · Giving away possessions  · Change in eating patterns- significant weight changes-  positive or negative  · Change in sleeping patterns- unable to sleep or sleeping all the time   · Unwillingness or inability to communicate  · Depression  · Unusual sadness, discouragement and loneliness  · Talk of wanting to die  · Neglect of personal appearance   · Rebelliousness- reckless behavior  · Withdrawal from people/activities they love  · Confusion- inability to concentrate     If you or a loved one observes any of these behaviors or has concerns about self-harm, here's what you can do:  · Talk about it- your feelings and reasons for harming yourself  · Remove any means that you might use to hurt yourself (examples: pills,  rope, extension cords, firearm)  · Get professional help from the community (Mental Health, Substance Abuse, psychological counseling)  · Do not be alone:Call your Safe Contact- someone whom you trust who will be there for you.  · Call your local CRISIS HOTLINE 686-0462 or 770-096-1966  · Call your local Children's Mobile Crisis Response Team [place] (942) 284-8350 or www.Nonpareil  · Call the toll free National Suicide Prevention Hotlines   · National Suicide Prevention Lifeline 002-013-ZIYY (7060)  · National Hope Line Network 800-SUICIDE (488-8763)

## 2022-06-13 NOTE — PROGRESS NOTES
Pt up to BR, steady gait, voided, tolerating po fluids and crackers, discharge instructions given to pt and son, all questions answered, diacharged to home   Statement Selected

## 2022-11-03 ENCOUNTER — PRE-ADMISSION TESTING (OUTPATIENT)
Dept: ADMISSIONS | Facility: MEDICAL CENTER | Age: 77
End: 2022-11-03
Attending: UROLOGY
Payer: MEDICARE

## 2022-11-03 DIAGNOSIS — Z01.810 PRE-OPERATIVE CARDIOVASCULAR EXAMINATION: ICD-10-CM

## 2022-11-03 DIAGNOSIS — Z01.812 PRE-OPERATIVE LABORATORY EXAMINATION: ICD-10-CM

## 2022-11-03 LAB
ANION GAP SERPL CALC-SCNC: 8 MMOL/L (ref 7–16)
APPEARANCE UR: CLEAR
BASOPHILS # BLD AUTO: 0.9 % (ref 0–1.8)
BASOPHILS # BLD: 0.06 K/UL (ref 0–0.12)
BILIRUB UR QL STRIP.AUTO: NEGATIVE
BUN SERPL-MCNC: 18 MG/DL (ref 8–22)
CALCIUM SERPL-MCNC: 9.2 MG/DL (ref 8.5–10.5)
CHLORIDE SERPL-SCNC: 104 MMOL/L (ref 96–112)
CO2 SERPL-SCNC: 27 MMOL/L (ref 20–33)
COLOR UR: YELLOW
CREAT SERPL-MCNC: 1.26 MG/DL (ref 0.5–1.4)
EKG IMPRESSION: NORMAL
EOSINOPHIL # BLD AUTO: 0.14 K/UL (ref 0–0.51)
EOSINOPHIL NFR BLD: 2.2 % (ref 0–6.9)
ERYTHROCYTE [DISTWIDTH] IN BLOOD BY AUTOMATED COUNT: 41.6 FL (ref 35.9–50)
GFR SERPLBLD CREATININE-BSD FMLA CKD-EPI: 59 ML/MIN/1.73 M 2
GLUCOSE SERPL-MCNC: 101 MG/DL (ref 65–99)
GLUCOSE UR STRIP.AUTO-MCNC: NEGATIVE MG/DL
HCT VFR BLD AUTO: 47.2 % (ref 42–52)
HGB BLD-MCNC: 15.5 G/DL (ref 14–18)
IMM GRANULOCYTES # BLD AUTO: 0.01 K/UL (ref 0–0.11)
IMM GRANULOCYTES NFR BLD AUTO: 0.2 % (ref 0–0.9)
INR PPP: 1.35 (ref 0.87–1.13)
KETONES UR STRIP.AUTO-MCNC: NEGATIVE MG/DL
LEUKOCYTE ESTERASE UR QL STRIP.AUTO: NEGATIVE
LYMPHOCYTES # BLD AUTO: 1.43 K/UL (ref 1–4.8)
LYMPHOCYTES NFR BLD: 22.4 % (ref 22–41)
MCH RBC QN AUTO: 29 PG (ref 27–33)
MCHC RBC AUTO-ENTMCNC: 32.8 G/DL (ref 33.7–35.3)
MCV RBC AUTO: 88.4 FL (ref 81.4–97.8)
MICRO URNS: NORMAL
MONOCYTES # BLD AUTO: 0.43 K/UL (ref 0–0.85)
MONOCYTES NFR BLD AUTO: 6.7 % (ref 0–13.4)
NEUTROPHILS # BLD AUTO: 4.31 K/UL (ref 1.82–7.42)
NEUTROPHILS NFR BLD: 67.6 % (ref 44–72)
NITRITE UR QL STRIP.AUTO: NEGATIVE
NRBC # BLD AUTO: 0 K/UL
NRBC BLD-RTO: 0 /100 WBC
PH UR STRIP.AUTO: 6.5 [PH] (ref 5–8)
PLATELET # BLD AUTO: 188 K/UL (ref 164–446)
PMV BLD AUTO: 9.9 FL (ref 9–12.9)
POTASSIUM SERPL-SCNC: 4.6 MMOL/L (ref 3.6–5.5)
PROT UR QL STRIP: NEGATIVE MG/DL
PROTHROMBIN TIME: 16.4 SEC (ref 12–14.6)
RBC # BLD AUTO: 5.34 M/UL (ref 4.7–6.1)
RBC UR QL AUTO: NEGATIVE
SODIUM SERPL-SCNC: 139 MMOL/L (ref 135–145)
SP GR UR STRIP.AUTO: 1.02
UROBILINOGEN UR STRIP.AUTO-MCNC: 0.2 MG/DL
WBC # BLD AUTO: 6.4 K/UL (ref 4.8–10.8)

## 2022-11-03 PROCEDURE — 80048 BASIC METABOLIC PNL TOTAL CA: CPT

## 2022-11-03 PROCEDURE — 85610 PROTHROMBIN TIME: CPT

## 2022-11-03 PROCEDURE — 85025 COMPLETE CBC W/AUTO DIFF WBC: CPT

## 2022-11-03 PROCEDURE — 87086 URINE CULTURE/COLONY COUNT: CPT

## 2022-11-03 PROCEDURE — 93005 ELECTROCARDIOGRAM TRACING: CPT

## 2022-11-03 PROCEDURE — 81003 URINALYSIS AUTO W/O SCOPE: CPT

## 2022-11-03 PROCEDURE — 36415 COLL VENOUS BLD VENIPUNCTURE: CPT

## 2022-11-03 PROCEDURE — 93010 ELECTROCARDIOGRAM REPORT: CPT | Performed by: INTERNAL MEDICINE

## 2022-11-03 ASSESSMENT — FIBROSIS 4 INDEX: FIB4 SCORE: 1.56

## 2022-11-04 ENCOUNTER — APPOINTMENT (OUTPATIENT)
Dept: ADMISSIONS | Facility: MEDICAL CENTER | Age: 77
End: 2022-11-04
Payer: MEDICARE

## 2022-11-05 LAB
BACTERIA UR CULT: NORMAL
SIGNIFICANT IND 70042: NORMAL
SITE SITE: NORMAL
SOURCE SOURCE: NORMAL

## 2022-11-08 ENCOUNTER — ANESTHESIA EVENT (OUTPATIENT)
Dept: SURGERY | Facility: MEDICAL CENTER | Age: 77
End: 2022-11-08
Payer: MEDICARE

## 2022-11-09 ENCOUNTER — HOSPITAL ENCOUNTER (OUTPATIENT)
Facility: MEDICAL CENTER | Age: 77
End: 2022-11-09
Attending: UROLOGY | Admitting: UROLOGY
Payer: MEDICARE

## 2022-11-09 ENCOUNTER — ANESTHESIA (OUTPATIENT)
Dept: SURGERY | Facility: MEDICAL CENTER | Age: 77
End: 2022-11-09
Payer: MEDICARE

## 2022-11-09 VITALS
HEIGHT: 71 IN | DIASTOLIC BLOOD PRESSURE: 83 MMHG | OXYGEN SATURATION: 94 % | HEART RATE: 69 BPM | TEMPERATURE: 97 F | BODY MASS INDEX: 24.91 KG/M2 | WEIGHT: 177.91 LBS | RESPIRATION RATE: 18 BRPM | SYSTOLIC BLOOD PRESSURE: 160 MMHG

## 2022-11-09 DIAGNOSIS — G89.18 POST-OPERATIVE PAIN: ICD-10-CM

## 2022-11-09 DIAGNOSIS — R35.0 URINARY FREQUENCY: ICD-10-CM

## 2022-11-09 PROCEDURE — 700105 HCHG RX REV CODE 258: Performed by: UROLOGY

## 2022-11-09 PROCEDURE — 700111 HCHG RX REV CODE 636 W/ 250 OVERRIDE (IP): Performed by: STUDENT IN AN ORGANIZED HEALTH CARE EDUCATION/TRAINING PROGRAM

## 2022-11-09 PROCEDURE — 99100 ANES PT EXTEME AGE<1 YR&>70: CPT | Performed by: STUDENT IN AN ORGANIZED HEALTH CARE EDUCATION/TRAINING PROGRAM

## 2022-11-09 PROCEDURE — 160009 HCHG ANES TIME/MIN: Performed by: UROLOGY

## 2022-11-09 PROCEDURE — A9270 NON-COVERED ITEM OR SERVICE: HCPCS | Performed by: UROLOGY

## 2022-11-09 PROCEDURE — 160028 HCHG SURGERY MINUTES - 1ST 30 MINS LEVEL 3: Performed by: UROLOGY

## 2022-11-09 PROCEDURE — 160035 HCHG PACU - 1ST 60 MINS PHASE I: Performed by: UROLOGY

## 2022-11-09 PROCEDURE — 160039 HCHG SURGERY MINUTES - EA ADDL 1 MIN LEVEL 3: Performed by: UROLOGY

## 2022-11-09 PROCEDURE — 00910 ANES TRANSURETHRAL PX NOS: CPT | Performed by: STUDENT IN AN ORGANIZED HEALTH CARE EDUCATION/TRAINING PROGRAM

## 2022-11-09 PROCEDURE — 160002 HCHG RECOVERY MINUTES (STAT): Performed by: UROLOGY

## 2022-11-09 PROCEDURE — 160046 HCHG PACU - 1ST 60 MINS PHASE II: Performed by: UROLOGY

## 2022-11-09 PROCEDURE — 700111 HCHG RX REV CODE 636 W/ 250 OVERRIDE (IP): Performed by: UROLOGY

## 2022-11-09 PROCEDURE — 700101 HCHG RX REV CODE 250: Performed by: STUDENT IN AN ORGANIZED HEALTH CARE EDUCATION/TRAINING PROGRAM

## 2022-11-09 PROCEDURE — 700102 HCHG RX REV CODE 250 W/ 637 OVERRIDE(OP): Performed by: UROLOGY

## 2022-11-09 PROCEDURE — 160048 HCHG OR STATISTICAL LEVEL 1-5: Performed by: UROLOGY

## 2022-11-09 PROCEDURE — 160036 HCHG PACU - EA ADDL 30 MINS PHASE I: Performed by: UROLOGY

## 2022-11-09 PROCEDURE — 160025 RECOVERY II MINUTES (STATS): Performed by: UROLOGY

## 2022-11-09 RX ORDER — ONDANSETRON 2 MG/ML
INJECTION INTRAMUSCULAR; INTRAVENOUS PRN
Status: DISCONTINUED | OUTPATIENT
Start: 2022-11-09 | End: 2022-11-09 | Stop reason: SURG

## 2022-11-09 RX ORDER — DEXAMETHASONE SODIUM PHOSPHATE 4 MG/ML
INJECTION, SOLUTION INTRA-ARTICULAR; INTRALESIONAL; INTRAMUSCULAR; INTRAVENOUS; SOFT TISSUE PRN
Status: DISCONTINUED | OUTPATIENT
Start: 2022-11-09 | End: 2022-11-09 | Stop reason: SURG

## 2022-11-09 RX ORDER — PHENAZOPYRIDINE HYDROCHLORIDE 95 MG/1
90 TABLET ORAL 3 TIMES DAILY
COMMUNITY

## 2022-11-09 RX ORDER — IBUPROFEN 800 MG/1
800 TABLET ORAL EVERY 8 HOURS PRN
COMMUNITY

## 2022-11-09 RX ORDER — PHENYLEPHRINE HYDROCHLORIDE 10 MG/ML
INJECTION, SOLUTION INTRAMUSCULAR; INTRAVENOUS; SUBCUTANEOUS PRN
Status: DISCONTINUED | OUTPATIENT
Start: 2022-11-09 | End: 2022-11-09 | Stop reason: SURG

## 2022-11-09 RX ORDER — OXYCODONE HYDROCHLORIDE AND ACETAMINOPHEN 5; 325 MG/1; MG/1
1 TABLET ORAL EVERY 6 HOURS PRN
Qty: 6 TABLET | Refills: 0 | Status: SHIPPED | OUTPATIENT
Start: 2022-11-09 | End: 2022-11-11

## 2022-11-09 RX ORDER — SODIUM CHLORIDE, SODIUM LACTATE, POTASSIUM CHLORIDE, CALCIUM CHLORIDE 600; 310; 30; 20 MG/100ML; MG/100ML; MG/100ML; MG/100ML
INJECTION, SOLUTION INTRAVENOUS CONTINUOUS
Status: DISCONTINUED | OUTPATIENT
Start: 2022-11-09 | End: 2022-11-09 | Stop reason: HOSPADM

## 2022-11-09 RX ORDER — ONDANSETRON 2 MG/ML
4 INJECTION INTRAMUSCULAR; INTRAVENOUS
Status: DISCONTINUED | OUTPATIENT
Start: 2022-11-09 | End: 2022-11-09 | Stop reason: HOSPADM

## 2022-11-09 RX ORDER — ACETAMINOPHEN 500 MG
1000 TABLET ORAL 3 TIMES DAILY PRN
COMMUNITY

## 2022-11-09 RX ORDER — HYDROMORPHONE HYDROCHLORIDE 1 MG/ML
0.1 INJECTION, SOLUTION INTRAMUSCULAR; INTRAVENOUS; SUBCUTANEOUS
Status: DISCONTINUED | OUTPATIENT
Start: 2022-11-09 | End: 2022-11-09 | Stop reason: HOSPADM

## 2022-11-09 RX ORDER — DIPHENHYDRAMINE HYDROCHLORIDE 50 MG/ML
12.5 INJECTION INTRAMUSCULAR; INTRAVENOUS
Status: DISCONTINUED | OUTPATIENT
Start: 2022-11-09 | End: 2022-11-09 | Stop reason: HOSPADM

## 2022-11-09 RX ORDER — OXYCODONE HYDROCHLORIDE AND ACETAMINOPHEN 5; 325 MG/1; MG/1
1 TABLET ORAL
Status: DISCONTINUED | OUTPATIENT
Start: 2022-11-09 | End: 2022-11-09 | Stop reason: HOSPADM

## 2022-11-09 RX ORDER — PHENAZOPYRIDINE HYDROCHLORIDE 200 MG/1
200 TABLET, FILM COATED ORAL 3 TIMES DAILY PRN
Qty: 8 TABLET | Refills: 0 | Status: SHIPPED | OUTPATIENT
Start: 2022-11-09 | End: 2022-11-12

## 2022-11-09 RX ORDER — HYDROMORPHONE HYDROCHLORIDE 1 MG/ML
0.2 INJECTION, SOLUTION INTRAMUSCULAR; INTRAVENOUS; SUBCUTANEOUS
Status: DISCONTINUED | OUTPATIENT
Start: 2022-11-09 | End: 2022-11-09 | Stop reason: HOSPADM

## 2022-11-09 RX ORDER — HYDROMORPHONE HYDROCHLORIDE 1 MG/ML
0.4 INJECTION, SOLUTION INTRAMUSCULAR; INTRAVENOUS; SUBCUTANEOUS
Status: DISCONTINUED | OUTPATIENT
Start: 2022-11-09 | End: 2022-11-09 | Stop reason: HOSPADM

## 2022-11-09 RX ORDER — OXYCODONE HYDROCHLORIDE AND ACETAMINOPHEN 5; 325 MG/1; MG/1
2 TABLET ORAL
Status: DISCONTINUED | OUTPATIENT
Start: 2022-11-09 | End: 2022-11-09 | Stop reason: HOSPADM

## 2022-11-09 RX ORDER — CEFAZOLIN SODIUM 1 G/3ML
INJECTION, POWDER, FOR SOLUTION INTRAMUSCULAR; INTRAVENOUS PRN
Status: DISCONTINUED | OUTPATIENT
Start: 2022-11-09 | End: 2022-11-09 | Stop reason: SURG

## 2022-11-09 RX ORDER — LIDOCAINE HYDROCHLORIDE 20 MG/ML
INJECTION, SOLUTION EPIDURAL; INFILTRATION; INTRACAUDAL; PERINEURAL PRN
Status: DISCONTINUED | OUTPATIENT
Start: 2022-11-09 | End: 2022-11-09 | Stop reason: SURG

## 2022-11-09 RX ORDER — ATROPA BELLADONNA AND OPIUM 16.2; 3 MG/1; MG/1
SUPPOSITORY RECTAL
Status: DISCONTINUED | OUTPATIENT
Start: 2022-11-09 | End: 2022-11-09 | Stop reason: HOSPADM

## 2022-11-09 RX ORDER — SODIUM CHLORIDE, SODIUM LACTATE, POTASSIUM CHLORIDE, CALCIUM CHLORIDE 600; 310; 30; 20 MG/100ML; MG/100ML; MG/100ML; MG/100ML
INJECTION, SOLUTION INTRAVENOUS CONTINUOUS
Status: ACTIVE | OUTPATIENT
Start: 2022-11-09 | End: 2022-11-09

## 2022-11-09 RX ORDER — FUROSEMIDE 10 MG/ML
INJECTION INTRAMUSCULAR; INTRAVENOUS PRN
Status: DISCONTINUED | OUTPATIENT
Start: 2022-11-09 | End: 2022-11-09 | Stop reason: SURG

## 2022-11-09 RX ADMIN — DEXAMETHASONE SODIUM PHOSPHATE 4 MG: 4 INJECTION, SOLUTION INTRA-ARTICULAR; INTRALESIONAL; INTRAMUSCULAR; INTRAVENOUS; SOFT TISSUE at 09:15

## 2022-11-09 RX ADMIN — FUROSEMIDE 10 MG: 10 INJECTION, SOLUTION INTRAMUSCULAR; INTRAVENOUS at 10:00

## 2022-11-09 RX ADMIN — PROPOFOL 150 MG: 10 INJECTION, EMULSION INTRAVENOUS at 08:56

## 2022-11-09 RX ADMIN — CEFAZOLIN 2 G: 330 INJECTION, POWDER, FOR SOLUTION INTRAMUSCULAR; INTRAVENOUS at 09:09

## 2022-11-09 RX ADMIN — SODIUM CHLORIDE, POTASSIUM CHLORIDE, SODIUM LACTATE AND CALCIUM CHLORIDE: 600; 310; 30; 20 INJECTION, SOLUTION INTRAVENOUS at 08:50

## 2022-11-09 RX ADMIN — SUGAMMADEX 200 MG: 100 INJECTION, SOLUTION INTRAVENOUS at 10:00

## 2022-11-09 RX ADMIN — PROPOFOL 50 MG: 10 INJECTION, EMULSION INTRAVENOUS at 08:59

## 2022-11-09 RX ADMIN — ONDANSETRON 4 MG: 2 INJECTION INTRAMUSCULAR; INTRAVENOUS at 09:59

## 2022-11-09 RX ADMIN — ROCURONIUM BROMIDE 30 MG: 10 INJECTION, SOLUTION INTRAVENOUS at 09:04

## 2022-11-09 RX ADMIN — PROPOFOL 50 MG: 10 INJECTION, EMULSION INTRAVENOUS at 09:36

## 2022-11-09 RX ADMIN — FENTANYL CITRATE 50 MCG: 50 INJECTION, SOLUTION INTRAMUSCULAR; INTRAVENOUS at 08:56

## 2022-11-09 RX ADMIN — ROCURONIUM BROMIDE 20 MG: 10 INJECTION, SOLUTION INTRAVENOUS at 09:36

## 2022-11-09 RX ADMIN — LIDOCAINE HYDROCHLORIDE 0.5 ML: 10 INJECTION, SOLUTION EPIDURAL; INFILTRATION; INTRACAUDAL; PERINEURAL at 07:29

## 2022-11-09 RX ADMIN — PROPOFOL 50 MG: 10 INJECTION, EMULSION INTRAVENOUS at 09:03

## 2022-11-09 RX ADMIN — PHENYLEPHRINE HYDROCHLORIDE 100 MCG: 10 INJECTION INTRAVENOUS at 09:18

## 2022-11-09 RX ADMIN — SUGAMMADEX 200 MG: 100 INJECTION, SOLUTION INTRAVENOUS at 08:34

## 2022-11-09 RX ADMIN — LIDOCAINE HYDROCHLORIDE 100 MG: 20 INJECTION, SOLUTION EPIDURAL; INFILTRATION; INTRACAUDAL at 08:56

## 2022-11-09 ASSESSMENT — FIBROSIS 4 INDEX: FIB4 SCORE: 1.62

## 2022-11-09 NOTE — DISCHARGE INSTRUCTIONS
HOME CARE INSTRUCTIONS    ACTIVITY: Rest and take it easy for the first 24 hours.  A responsible adult is recommended to remain with you during that time.  It is normal to feel sleepy.  We encourage you to not do anything that requires balance, judgment or coordination.    FOR 24 HOURS DO NOT:  Drive, operate machinery or run household appliances.  Drink beer or alcoholic beverages.  Make important decisions or sign legal documents.    SPECIAL INSTRUCTIONS: Make sure to drink lots of water. Appointment for mccord removal made for Thursday. Meds called in.    Indwelling Urinary Catheter Care, Adult  An indwelling urinary catheter is a thin, flexible, germ-free (sterile) tube that is placed into the bladder to help drain urine out of the body. The catheter is inserted into the part of the body that drains urine from the bladder (urethra). Urine drains from the catheter into a drainage bag outside of the body.  Taking good care of your catheter will keep it working properly and help to prevent problems from developing.  What are the risks?  Bacteria may get into your bladder and cause a urinary tract infection.  Urine flow can become blocked. This can happen if the catheter is not working correctly, or if you have sediment or a blood clot in your bladder or the catheter.  Tissue near the catheter may become irritated and bleed.  How to wear your catheter and your drainage bag  Supplies needed  Adhesive tape or a leg strap.  Alcohol wipe or soap and water (if you use tape).  A clean towel (if you use tape).  Overnight drainage bag.  Smaller drainage bag (leg bag).  Wearing your catheter and bag  Use adhesive tape or a leg strap to attach your catheter to your leg.  Make sure the catheter is not pulled tight.  If a leg strap gets wet, replace it with a dry one.  If you use adhesive tape:  Use an alcohol wipe or soap and water to wash off any stickiness on your skin where you had tape before.  Use a clean towel to pat-dry  the area.  Apply the new tape.  You should have received a large overnight drainage bag and a smaller leg bag that fits underneath clothing.  You may wear the overnight bag at any time, but you should not wear the leg bag at night.  Always wear the leg bag below your knee.  Make sure the overnight drainage bag is always lower than the level of your bladder, but do not let it touch the floor. Before you go to sleep, hang the bag inside a wastebasket that is covered by a clean plastic bag.  How to care for your skin around the catheter  Supplies needed  A clean washcloth.  Water and mild soap.  A clean towel.  Caring for your skin and catheter  Every day, use a clean washcloth and soapy water to clean the skin around your catheter.  Wash your hands with soap and water.  Wet a washcloth in warm water and mild soap.  Clean the skin around your urethra.  If you are female:  Use one hand to gently spread the folds of skin around your vagina (labia).  With the washcloth in your other hand, wipe the inner side of your labia on each side. Do this in a front-to-back direction.  If you are male:  Use one hand to pull back any skin that covers the end of your penis (foreskin).  With the washcloth in your other hand, wipe your penis in small circles. Start wiping at the tip of your penis, then move outward from the catheter.  Move the foreskin back in place, if this applies.  With your free hand, hold the catheter close to where it enters your body. Keep holding the catheter during cleaning so it does not get pulled out.  Use your other hand to clean the catheter with the washcloth.  Only wipe downward on the catheter.  Do not wipe upward toward your body, because that may push bacteria into your urethra and cause infection.  Use a clean towel to pat-dry the catheter and the skin around it. Make sure to wipe off all soap.  Wash your hands with soap and water.  Shower every day. Do not take baths.  Do not use cream, ointment, or  lotion on the area where the catheter enters your body, unless your health care provider tells you to do that.  Do not use powders, sprays, or lotions on your genital area.  Check your skin around the catheter every day for signs of infection. Check for:  Redness, swelling, or pain.  Fluid or blood.  Warmth.  Pus or a bad smell.  How to empty the drainage bag  Supplies needed  Rubbing alcohol.  Gauze pad or cotton ball.  Adhesive tape or a leg strap.  Emptying the bag  Empty your drainage bag (your overnight drainage bag or your leg bag) when it is ?-½ full, or at least 2-3 times a day. Clean the drainage bag according to the 's instructions or as told by your health care provider.  Wash your hands with soap and water.  Detach the drainage bag from your leg.  Hold the drainage bag over the toilet or a clean container. Make sure the drainage bag is lower than your hips and bladder. This stops urine from going back into the tubing and into your bladder.  Open the pour spout at the bottom of the bag.  Empty the urine into the toilet or container. Do not let the pour spout touch any surface. This precaution is important to prevent bacteria from getting in the bag and causing infection.  Apply rubbing alcohol to a gauze pad or cotton ball.  Use the gauze pad or cotton ball to clean the pour spout.  Close the pour spout.  Attach the bag to your leg with adhesive tape or a leg strap.  Wash your hands with soap and water.  How to change the drainage bag  Supplies needed:  Alcohol wipes.  A clean drainage bag.  Adhesive tape or a leg strap.  Changing the bag  Replace your drainage bag with a clean bag if it leaks, starts to smell bad, or looks dirty.  Wash your hands with soap and water.  Detach the dirty drainage bag from your leg.  Pinch the catheter with your fingers so that urine does not spill out.  Disconnect the catheter tube from the drainage tube at the connection valve. Do not let the tubes touch any  surface.  Clean the end of the catheter tube with an alcohol wipe. Use a different alcohol wipe to clean the end of the drainage tube.  Connect the catheter tube to the drainage tube of the clean bag.  Attach the clean bag to your leg with adhesive tape or a leg strap. Avoid attaching the new bag too tightly.  Wash your hands with soap and water.  General instructions  Never pull on your catheter or try to remove it. Pulling can damage your internal tissues.  Always wash your hands before and after you handle your catheter or drainage bag. Use a mild, fragrance-free soap. If soap and water are not available, use hand .  Always make sure there are no twists or bends (kinks) in the catheter tube.  Always make sure there are no leaks in the catheter or drainage bag.  Drink enough fluid to keep your urine pale yellow.  Do not take baths, swim, or use a hot tub.  If you are female, wipe from front to back after having a bowel movement.  Contact a health care provider if:  Your urine is cloudy.  Your urine smells unusually bad.  Your catheter gets clogged.  Your catheter starts to leak.  Your bladder feels full.  Get help right away if:  You have redness, swelling, or pain where the catheter enters your body.  You have fluid, blood, pus, or a bad smell coming from the area where the catheter enters your body.  The area where the catheter enters your body feels warm to the touch.  You have a fever.  You have pain in your abdomen, legs, lower back, or bladder.  You see blood in the catheter.  Your urine is pink or red.  You have nausea, vomiting, or chills.  Your urine is not draining into the bag.  Your catheter gets pulled out.  Summary  An indwelling urinary catheter is a thin, flexible, germ-free (sterile) tube that is placed into the bladder to help drain urine out of the body.  The catheter is inserted into the part of the body that drains urine from the bladder (urethra).  Take good care of your catheter to  keep it working properly and help prevent problems from developing.  Always wash your hands before and after you handle your catheter or drainage bag.  Never pull on your catheter or try to remove it.      Transurethral Resection of the Prostate, Care After  This sheet gives you information about how to care for yourself after your procedure. Your health care provider may also give you more specific instructions. If you have problems or questions, contact your health care provider.  What can I expect after the procedure?  After the procedure, it is common to have:  Mild pain in your lower abdomen.  Soreness or mild discomfort in your penis from having the catheter inserted during the procedure.  A feeling of urgency when you need to urinate.  A small amount of blood in your urine. You may notice some small blood clots in your urine. These are normal.  Follow these instructions at home:  Medicines  Take over-the-counter and prescription medicines only as told by your health care provider.  If you were prescribed an antibiotic medicine, take it as told by your health care provider. Do not stop taking the antibiotic even if you start to feel better.  Ask your health care provider if the medicine prescribed to you:  Requires you to avoid driving or using heavy machinery.  Can cause constipation. You may need to take actions to prevent or treat constipation, such as:  Take over-the-counter or prescription medicines.  Eat foods that are high in fiber, such as fresh fruits and vegetables, whole grains, and beans.  Limit foods that are high in fat and processed sugars, such as fried or sweet foods.  Do not drive for 24 hours if you were given a sedative during your procedure.  Activity  Return to your normal activities as told by your health care provider. Ask your health care provider what activities are safe for you.  Do not lift anything that is heavier than 10 lb (4.5 kg), or the limit that you are told, for 3 weeks  after the procedure or until your health care provider says that it is safe.  Avoid intense physical activity for as long as told by your health care provider.  Avoid sitting for a long time without moving. Get up and move around one or more times every few hours. This helps to prevent blood clots. You may increase your physical activity gradually as you start to feel better.  Lifestyle  Do not drink alcohol for as long as told by your health care provider. This is especially important if you are taking prescription pain medicines.  Do not engage in sexual activity until your health care provider says that you can do this.  General instructions  Do not take baths, swim, or use a hot tub until your health care provider approves.  Drink enough fluid to keep your urine pale yellow.  Urinate as soon as you feel the need to. Do not try to hold your urine for long periods of time.  If your health care provider approves, you may take a stool softener for 2-3 weeks to prevent you from straining to have a bowel movement.  Wear compression stockings as told by your health care provider. These stockings help to prevent blood clots and reduce swelling in your legs.  Keep all follow-up visits as told by your health care provider. This is important.  Contact a health care provider if you have:  Difficulty urinating.  A fever.  Pain that gets worse or does not improve with medicine.  Blood in your urine that does not go away after 1 week of resting and drinking more fluids.  Swelling in your penis or testicles.  Get help right away if:  You are unable to urinate.  You are having more blood clots in your urine instead of fewer.  You have:  Large blood clots.  A lot of blood in your urine.  Pain in your back or lower abdomen.  Pain or swelling in your legs.  Chills and you are shaking.  Difficulty breathing or shortness of breath.  Summary  After the procedure, it is common to have a small amount of blood in your urine.  Avoid heavy  lifting and intense physical activity for as long as told by your health care provider.  Urinate as soon as you feel the need to. Do not try to hold your urine for long periods of time.  Keep all follow-up visits as told by your health care provider. This is important.  This information is not intended to replace advice given to you by your health care provider. Make sure you discuss any questions you have with your health care provider.    DIET: To avoid nausea, slowly advance diet as tolerated, avoiding spicy or greasy foods for the first day.  Add more substantial food to your diet according to your physician's instructions.  Babies can be fed formula or breast milk as soon as they are hungry.  INCREASE FLUIDS AND FIBER TO AVOID CONSTIPATION.      MEDICATIONS: Resume taking daily medication.  Take prescribed pain medication with food.  If no medication is prescribed, you may take non-aspirin pain medication if needed.  PAIN MEDICATION CAN BE VERY CONSTIPATING.  Take a stool softener or laxative such as senokot, pericolace, or milk of magnesia if needed.    Prescription given for pyridium, percocet. You may start this pain medication at any time.    A follow-up appointment should be arranged with your doctor; call to schedule.    You should CALL YOUR PHYSICIAN if you develop:  Fever greater than 101 degrees F.  Pain not relieved by medication, or persistent nausea or vomiting.  Excessive bleeding (blood soaking through dressing) or unexpected drainage from the wound.  Extreme redness or swelling around the incision site, drainage of pus or foul smelling drainage.  Inability to urinate or empty your bladder within 8 hours.  Problems with breathing or chest pain.    You should call 911 if you develop problems with breathing or chest pain.  If you are unable to contact your doctor or surgical center, you should go to the nearest emergency room or urgent care center.  Physician's telephone #: [phone]    MILD  FLU-LIKE SYMPTOMS ARE NORMAL.  YOU MAY EXPERIENCE GENERALIZED MUSCLE ACHES, THROAT IRRITATION, HEADACHE AND/OR SOME NAUSEA.    If any questions arise, call your doctor.  If your doctor is not available, please feel free to call the Surgical Center at [phone].  The Center is open Monday through Friday from 7AM to 7PM.      A registered nurse may call you a few days after your surgery to see how you are doing after your procedure.    You may also receive a survey in the mail within the next two weeks and we ask that you take a few moments to complete the survey and return it to us.  Our goal is to provide you with very good care and we value your comments.     Depression / Suicide Risk    As you are discharged from this [facility] facility, it is important to learn how to keep safe from harming yourself.    Recognize the warning signs:  Abrupt changes in personality, positive or negative- including increase in energy   Giving away possessions  Change in eating patterns- significant weight changes-  positive or negative  Change in sleeping patterns- unable to sleep or sleeping all the time   Unwillingness or inability to communicate  Depression  Unusual sadness, discouragement and loneliness  Talk of wanting to die  Neglect of personal appearance   Rebelliousness- reckless behavior  Withdrawal from people/activities they love  Confusion- inability to concentrate     If you or a loved one observes any of these behaviors or has concerns about self-harm, here's what you can do:  Talk about it- your feelings and reasons for harming yourself  Remove any means that you might use to hurt yourself (examples: pills, rope, extension cords, firearm)  Get professional help from the community (Mental Health, Substance Abuse, psychological counseling)  Do not be alone:Call your Safe Contact- someone whom you trust who will be there for you.  Call your local CRISIS HOTLINE 183-3280 or 352-478-7628  Call your local  Children's Mobile Crisis Response Team [place] (048) 248-3303 or www.[place] (by [employer]  Call the toll free National Suicide Prevention Hotlines   National Suicide Prevention Lifeline 978-749-QPLC (0320)  [place] Line Network 800-SUICIDE (714-2920)    I acknowledge receipt and understanding of these Home Care instructions.

## 2022-11-09 NOTE — PROGRESS NOTES
Med Rec Complete per patient  Allergies Reviewed with patiient  No antibiotics within the last 30 days  Patient's Preferred Pharmacy: [facility] on [address]      Patient takes Eliquis 5mg, twice daily.

## 2022-11-09 NOTE — ANESTHESIA PROCEDURE NOTES
Airway    Date/Time: 11/9/2022 9:06 AM  Performed by: Barrie Yang M.D.  Authorized by: Barrie Yang M.D.     Location:  OR  Urgency:  Elective  Difficult Airway: Yes     Patient with acutely angled downward turn at posterior pharynx, making it difficult to insert an LMA.  After attempting to place well lubed LMAs size 4 and 3, I moved to intubate.  The patient has an anterior glottis, leading to grade 3 view, so I switched to a glidescope.  Patient intubated easily with a MAC3 glidescope  Indications for Airway Management:  Anesthesia      Spontaneous Ventilation: absent    Sedation Level:  Deep  Preoxygenated: Yes    Patient Position:  Sniffing  Mask Difficulty Assessment:  1 - vent by mask  Final Airway Type:  Endotracheal airway  Final Endotracheal Airway:  ETT  Cuffed: Yes    Technique Used for Successful ETT Placement:  Direct laryngoscopy  Devices/Methods Used in Placement:  Anterior pressure/BURP    Insertion Site:  Oral  Blade Type:  Glide  Laryngoscope Blade/Videolaryngoscope Blade Size:  3  ETT Size (mm):  7.0  Measured from:  Teeth  ETT to Teeth (cm):  24  Placement Verified by: auscultation and capnometry    Cormack-Lehane Classification:  Grade I - full view of glottis  Number of Attempts at Approach:  1  Number of Other Approaches Attempted:  2  Unsuccessful airways attempted: LMA sizes 4 and 3.  Unsuccessful Approach(es) for ETT:  Direct laryngoscopy

## 2022-11-09 NOTE — ANESTHESIA PREPROCEDURE EVALUATION
Case: [id number] Date/Time: 11/09/22 0815    Procedure: GREEN LIGHT LASER TRANSURETHRAL RESECTION OF PROSTATE    Pre-op diagnosis: LOWER URINARY TRACT SYMPTOMS DUE TO BENIGN PROSTATIC HYPERTROPHY    Location: [facility] OR 18 / SURGERY [facility]    Surgeons: Adilson Galvan M.D.        78 yo M w/ Afib (ablated, but on apixaban and flecainide), GERD, CKDIII (GFR 52, stable for 8 years), and BPH    Relevant Problems      (positive) Chronic kidney disease (CKD) stage G3a/A1, moderately decreased glomerular filtration rate (GFR) between 45-59 mL/min/1.73 square meter and albuminuria creatinine ratio less than 30 mg/g (HCC)      Other   (positive) Splenic infarct       Physical Exam    Airway   Mallampati: II  TM distance: >3 FB  Neck ROM: full       Cardiovascular - normal exam  Rhythm: regular  Rate: normal  (-) murmur     Dental - normal exam           Pulmonary - normal exam  Breath sounds clear to auscultation     Abdominal    Neurological - normal exam                 Anesthesia Plan    ASA 3   ASA physical status 3 criteria: other (comment)    Plan - general       Airway plan will be LMA          Induction: intravenous    Postoperative Plan: Postoperative administration of opioids is intended.    Pertinent diagnostic labs and testing reviewed    Informed Consent:    Anesthetic plan and risks discussed with patient.    Use of blood products discussed with: patient whom consented to blood products.

## 2022-11-09 NOTE — OR NURSING
CBI off at 11:00 per MD order. Patient urine light cherry color, one small clot noted, mccord draining well to gravity. Stat lock in place. CBI removed and catheter plug placed. Patient ambulated to bathroom, small BM reported. Patient steady on feet. No complaints of increased pain or nausea with ambulation.

## 2022-11-09 NOTE — OR NURSING
Report called to Huma BURNS. Plan of care discussed. Patient denies pain or nausea. Expresses readiness for discharge.

## 2022-11-09 NOTE — OP REPORT
DATE OF SERVICE:  11/09/2022     NAME OF OPERATION:  GreenLight photoselective vaporization of the prostate.     PREOPERATIVE DIAGNOSIS:  Benign prostatic hypertrophy with obstruction.     POSTOPERATIVE DIAGNOSIS:  Benign prostatic hypertrophy with obstruction.     PRIMARY SURGEON:  Adilson Galvan MD     ANESTHESIOLOGIST:  Barrie Yang MD     FINDINGS:  1.  Approximately 55 gram prostate with mostly bilobar hypertrophy.  2.  205,000 joules.     INDICATIONS:  Briefly, the patient is a 77-year-old gentleman suffers from   lower urinary tract symptoms related to enlarged prostate.  Upon consideration   of his options, he has elected to undergo surgical management with GreenLight   PVP.  Informed consent has been obtained.     OPERATION IN DETAIL:  The patient was taken to the operating room and placed   on the operating table in supine position.  After administration of general   anesthetic, he was placed in lithotomy.  Genitals were prepped and draped   sterilely.  A 22-[place] laser scope was passed in the bladder with visualizing   obturator.  Urethra was within normal limits.  Prostatic urethra demonstrated   mostly lateral lobe occlusion of the prostatic fossa.  There was moderate   trabeculations.  No tumors, no stones.     A GreenLight  watt fiber was utilized beginning at 80 watt settings to   create a trough at the 5 and 7 o'clock position.  This was carried back   towards the verumontanum.  The floor of the prostate was then vaporized down   at the bladder neck and throughout its length.     Continue with the lower energy settings, the bladder neck was opened on the   left and right side.  Energy settings were raised and the right lateral lobe   was then vaporized back towards the level of the surgical capsule with care   not to vaporize tissue distal to the verumontanum, that is the apical tissue.     The same was performed on the contralateral side.  At the conclusion of   vaporization, there  appeared to be a wide-open prostatic fossa and bladder   neck.  Vaporization was not carried distal to the verumontanum.  Both ureteral   orifices were also untouched.     The bladder was drained of a small amount of debris.  The fossa was again   inspected and there appeared to be very minimal bleeding.     A 22-[place] 3-way catheter was then passed into the bladder.  It was draining   fairly clear irrigant on moderate CBI at the conclusion.  It is our intention   that the CBI will be weaned in recovery and he will go home with [place]   catheter with a void trial in our office tomorrow.  He tolerated the procedure   well and was taken to recovery room in stable condition.        ______________________________  Adilson Galvan MD MCM/DARCIE    DD:  11/09/2022 10:17  DT:  11/09/2022 10:36    Job#:  [id number]

## 2022-11-09 NOTE — OR NURSING
Patient arrived to unit at 1208. Patient is AOx4. Transferred to chair appropriately. Patient's pain is 1/10. Declines pain medication at this time. Patient voiding appropriately via mccord catheter. Drainage bag below bladder. Urine in bad is light red. No clots visible. Tolerating liquids at this time. Discharge instructions discussed with the patient. Patient denies any further questions at this time. Patient discharged home to responsible adult at 1236.

## 2022-11-09 NOTE — OR SURGEON
Immediate Post OP Note    PreOp Diagnosis: bph with obstruction      PostOp Diagnosis: same      Procedure(s):  GREEN LIGHT LASER TRANSURETHRAL RESECTION OF PROSTATE - Wound Class: Clean Contaminated    Surgeon(s):  Adilson Galvan M.D.    Anesthesiologist/Type of Anesthesia:  Anesthesiologist: Barrie Yang M.D./General    Surgical Staff:  Circulator: Jayson Jarvis R.N.; Rashard Smith R.N.  Laser Staff: Gordon Monahan  Scrub Person: Rashard Tirado    Specimens removed if any:  * No specimens in log *    Estimated Blood Loss: <100mL    Findings: mostly bilobar hypertrophy.  Estimated 55g prostate.  205k joules    Complications: none        11/9/2022 10:10 AM Adilson Galvan M.D.

## 2022-11-09 NOTE — ANESTHESIA POSTPROCEDURE EVALUATION
Patient: Chi Ashley    Procedure Summary     Date: 11/09/22 Room / Location: [place] [address] / SURGERY [facility]    Anesthesia Start: 0850 Anesthesia Stop: 1024    Procedure: GREEN LIGHT LASER TRANSURETHRAL RESECTION OF PROSTATE (Urethra) Diagnosis: (LOWER URINARY TRACT SYMPTOMS DUE TO BENIGN PROSTATIC HYPERTROPHY)    Surgeons: Adilson Galvan M.D. Responsible Provider: Barrie Yang M.D.    Anesthesia Type: general ASA Status: 3          Final Anesthesia Type: general  Last vitals  BP   Blood Pressure : 137/66    Temp   36.6 °C (97.8 °F)    Pulse   62   Resp   16    SpO2   96 %      Anesthesia Post Evaluation    Patient location during evaluation: PACU  Patient participation: complete - patient participated  Level of consciousness: sleepy but conscious    Airway patency: patent  Anesthetic complications: no  Cardiovascular status: hemodynamically stable  Respiratory status: acceptable  Hydration status: euvolemic    PONV: none          Encounter Notable Events   Notable Event Outcome Phase Comment   Difficult to intubate - unexpected  Intraprocedure Successful glide scope intubation        Nurse Pain Score: 0 (NPRS)

## 2022-11-11 ENCOUNTER — DOCUMENTATION (OUTPATIENT)
Dept: HEALTH INFORMATION MANAGEMENT | Facility: OTHER | Age: 77
End: 2022-11-11
Payer: MEDICARE

## 2023-01-01 NOTE — TELEPHONE ENCOUNTER
1. Caller Name: Chi Ashley Dr.                                           Call Back Number: [phone] (home) [phone] (work)        Patient approves a detailed voicemail message: N\A    Patient wanted his last chart note faxed tp [phone], this has been addressed as requested by the patient.  
2 seconds or less

## (undated) DEVICE — TUBING, SPIROMETRY KIT

## (undated) DEVICE — GOWN WARMING STANDARD FLEX - (30/CA)

## (undated) DEVICE — TROCAR 12 X 150 KII FIOS Z THREAD (6EA/BX)

## (undated) DEVICE — NEEDLE NON SAFETY HYPO 22 GA X 1 1/2 IN (100/BX)

## (undated) DEVICE — LACTATED RINGERS INJ 1000 ML - (14EA/CA 60CA/PF)

## (undated) DEVICE — CANISTER SUCTION 3000ML MECHANICAL FILTER AUTO SHUTOFF MEDI-VAC NONSTERILE LF DISP  (40EA/CA)

## (undated) DEVICE — GLOVE BIOGEL PI INDICATOR SZ 7.0 SURGICAL PF LF - (50/BX 4BX/CA)

## (undated) DEVICE — BAG, SPONGE COUNT 50600

## (undated) DEVICE — NEPTUNE 4 PORT MANIFOLD - (20/PK)

## (undated) DEVICE — GLOVE, LITE (PAIR)

## (undated) DEVICE — NEEDLE INSFL 120MM 14GA VRRS - (20/BX)

## (undated) DEVICE — SET EXTENSION WITH 2 PORTS (48EA/CA) ***PART #2C8610 IS A SUBSTITUTE*****

## (undated) DEVICE — SODIUM CHL. IRRIGATION 0.9% 3000ML (4EA/CA 65CA/PF)

## (undated) DEVICE — PROTECTOR ULNA NERVE - (36PR/CA)

## (undated) DEVICE — TUBING CLEARLINK DUO-VENT - C-FLO (48EA/CA)

## (undated) DEVICE — SUTURE 2-0 30CM STRATAFIX SPIRAL PDO ***WAS PART #SXPD1B401 *****

## (undated) DEVICE — BLADE SURGICAL CLIPPER - (50EA/CA)

## (undated) DEVICE — TOWEL STOP TIMEOUT SAFETY FLAG (40EA/CA)

## (undated) DEVICE — GLOVE BIOGEL SZ 6.5 SURGICAL PF LTX (50PR/BX 4BX/CA)

## (undated) DEVICE — MASK, LARYNGEAL AIRWAY #4

## (undated) DEVICE — GLOVE BIOGEL SZ 8 SURGICAL PF LTX - (50PR/BX 4BX/CA)

## (undated) DEVICE — CANISTER SUCTION RIGID RED 1500CC (40EA/CA)

## (undated) DEVICE — SLEEVE, VASO, THIGH, MED

## (undated) DEVICE — ELECTRODE 850 FOAM ADHESIVE - HYDROGEL RADIOTRNSPRNT (50/PK)

## (undated) DEVICE — SEAL CANNULA DA VINCI - (10/BX)

## (undated) DEVICE — SHAVER 5.5 RESECTOR FORMULA (5EA/BX )

## (undated) DEVICE — GLOVE BIOGEL PI ORTHO SZ 8.5 PF LF (40/BX)

## (undated) DEVICE — DRESSING XEROFORM 1X8 - (50/BX 4BX/CA)

## (undated) DEVICE — GOWN SURGEONS X-LARGE - DISP. (30/CA)

## (undated) DEVICE — SUTURE 4-0 MONOCRYL PLUS PS-2 - 27 INCH (36/BX)

## (undated) DEVICE — SYRINGE 30 ML LL (56/BX)

## (undated) DEVICE — SUCTION INSTRUMENT YANKAUER BULBOUS TIP W/O VENT (50EA/CA)

## (undated) DEVICE — HEAD HOLDER JUNIOR/ADULT

## (undated) DEVICE — SENSOR OXIMETER ADULT SPO2 RD SET (20EA/BX)

## (undated) DEVICE — SET LEADWIRE 5 LEAD BEDSIDE DISPOSABLE ECG (1SET OF 5/EA)

## (undated) DEVICE — Device

## (undated) DEVICE — KIT ROOM DECONTAMINATION

## (undated) DEVICE — CHLORAPREP 26 ML APPLICATOR - ORANGE TINT(25/CA)

## (undated) DEVICE — PAD PREP 24 X 48 CUFFED - (100/CA)

## (undated) DEVICE — JELLY SURGILUBE STERILE TUBE 4.25 OZ (1/EA)

## (undated) DEVICE — MASK ANESTHESIA ADULT  - (100/CA)

## (undated) DEVICE — MASK AIRWAY SIZE 4 UNIQUE SILICON (10EA/BX)

## (undated) DEVICE — GLOVE BIOGEL PI INDICATOR SZ 6.5 SURGICAL PF LF - (50/BX 4BX/CA)

## (undated) DEVICE — SUTURE2-0 20CM STRATAFIX SPIRAL PDS CT-1 (12EA/BX)

## (undated) DEVICE — GLOVE BIOGEL ECLIPSE PF LATEX SIZE 8.0  (50PR/BX)

## (undated) DEVICE — ELECTRODE DUAL RETURN W/ CORD - (50/PK)

## (undated) DEVICE — COVER FOOT UNIVERSAL DISP. - (25EA/CA)

## (undated) DEVICE — SUTURE GENERAL

## (undated) DEVICE — OBTURATOR 8MM BLADELESS - (24EA/BX) DA VINCI

## (undated) DEVICE — STOCKINET BIAS 6 IN STERILE - (20/CA)

## (undated) DEVICE — GLOVE BIOGEL SZ 7 SURGICAL PF LTX - (50PR/BX 4BX/CA)

## (undated) DEVICE — SET IRRIGATION CYSTOSCOPY Y-TYPE L81 IN (20EA/CA)

## (undated) DEVICE — SOD. CHL. INJ. 0.9% 1000 ML - (14EA/CA 60CA/PF)

## (undated) DEVICE — SUTURE 0 VICRYL PLUS CT-2 - 27 INCH (36/BX)

## (undated) DEVICE — SHAVER4.0 AGGRESSIVE + FORMLA (5EA/BX)

## (undated) DEVICE — KIT ANESTHESIA W/CIRCUIT & 3/LT BAG W/FILTER (20EA/CA)

## (undated) DEVICE — GLOVE SURGICAL PROTEXIS PI 8.0 LF - (50PR/BX)

## (undated) DEVICE — SPONGE GAUZESTER 4 X 4 4PLY - (128PK/CA)

## (undated) DEVICE — SYRINGE, SLIP TIP 10ML

## (undated) DEVICE — GVL 3 STAT DISPOSABLE - (10/BX)

## (undated) DEVICE — FORCEP BIPOLAR FENESTRATED - DA VINCI 10X'S REUSABLE

## (undated) DEVICE — CONNECTOR HOSE NEPTUNE FOR CYSTO ROOM

## (undated) DEVICE — SYSTEM CLEARIFY VISUALIZATION (10EA/PK)

## (undated) DEVICE — COVER LIGHT HANDLE FLEXIBLE - SOFT (2EA/PK 80PK/CA)

## (undated) DEVICE — CATHETER URETHRAL FOLEY SILICONE 22 FR 30 ML 3-WAY

## (undated) DEVICE — DRESSING ABDOMINAL PAD STERILE 8 X 10" (360EA/CA)"

## (undated) DEVICE — HUMID-VENT HEAT AND MOISTURE EXCHANGE- (50/BX)

## (undated) DEVICE — PACK CYSTOSCOPY III - (8/CA)

## (undated) DEVICE — PACK KNEE ARTHROSCOPY SM OR - (2EA/CA)

## (undated) DEVICE — DRAPE MAYO STAND - (30/CA)

## (undated) DEVICE — GLOVE SZ 8 BIOGEL PI MICRO - PF LF (50PR/BX)

## (undated) DEVICE — GOWN SURGEONS LARGE - (32/CA)

## (undated) DEVICE — TUBING PUMP WITH CONNECTOR REDEUCE (1EA)

## (undated) DEVICE — FIBER GREEN LIGHT SINGLE USE

## (undated) DEVICE — TUBE CONNECTING SUCTION - CLEAR PLASTIC STERILE 72 IN (50EA/CA)

## (undated) DEVICE — BAG URODRAIN WITH TUBING - (20/CA)

## (undated) DEVICE — GOWN SURGICAL X-LARGE ULTRA - FILM-REINFORCED (20/CA)

## (undated) DEVICE — DRAPE LARGE 3 QUARTER - (20/CA)

## (undated) DEVICE — COVER TIP ENDOWRIST HOT SHEAR - (10EA/BX) DA VINCI

## (undated) DEVICE — SENSOR SPO2 NEO LNCS ADHESIVE (20/BX) SEE USER NOTES

## (undated) DEVICE — ARTHROWAND TURBOVAC 3.5/90 SCT

## (undated) DEVICE — DRAPE 3 ARM DA VANCI SI - (5EA/CA)

## (undated) DEVICE — TOURNIQUET, STERILE 34 (BLUE)

## (undated) DEVICE — GLOVE BIOGEL SZ 8.5 SURGICAL PF LTX - (50PR/BX 4BX/CA)

## (undated) DEVICE — SLEEVE STERILE  A599T - 30/BX 2BX/CS

## (undated) DEVICE — TAPE 1 IN X 10 YD. (DURAPORE) - (12/BX 10BX/CA)

## (undated) DEVICE — GLOVE BIOGEL SZ 7.5 SURGICAL PF LTX - (50PR/BX 4BX/CA)

## (undated) DEVICE — TUBING PATIENT W/CONNECTOR REDEUCE (1EA)

## (undated) DEVICE — DERMABOND ADVANCED - (12EA/BX)

## (undated) DEVICE — BLOCK

## (undated) DEVICE — SYRINGE DISP. 60 CC LL - (30/BX, 12BX/CA)**WHEN THESE ARE GONE ORDER #500206**

## (undated) DEVICE — ROBOTIC SURGERY SERVICES

## (undated) DEVICE — PADDING CAST 6 IN STERILE - 6 X 4 YDS (24/CA)

## (undated) DEVICE — COVER LIGHT HANDLE ALC PLUS DISP (18EA/BX)

## (undated) DEVICE — GLOVE BIOGEL PI INDICATOR SZ 8.0 SURGICAL PF LF -(50/BX 4BX/CA)

## (undated) DEVICE — SPONGE GAUZE STER 4X4 8-PL - (2/PK 50PK/BX 12BX/CS)